# Patient Record
Sex: MALE | Race: BLACK OR AFRICAN AMERICAN | NOT HISPANIC OR LATINO | URBAN - METROPOLITAN AREA
[De-identification: names, ages, dates, MRNs, and addresses within clinical notes are randomized per-mention and may not be internally consistent; named-entity substitution may affect disease eponyms.]

---

## 2019-04-27 PROBLEM — Z00.00 ENCOUNTER FOR PREVENTIVE HEALTH EXAMINATION: Status: ACTIVE | Noted: 2019-04-27

## 2019-04-28 ENCOUNTER — OUTPATIENT (OUTPATIENT)
Dept: OUTPATIENT SERVICES | Facility: HOSPITAL | Age: 24
LOS: 1 days | End: 2019-04-28
Payer: COMMERCIAL

## 2019-04-28 ENCOUNTER — APPOINTMENT (OUTPATIENT)
Dept: CT IMAGING | Facility: IMAGING CENTER | Age: 24
End: 2019-04-28
Payer: COMMERCIAL

## 2019-04-28 DIAGNOSIS — Z00.8 ENCOUNTER FOR OTHER GENERAL EXAMINATION: ICD-10-CM

## 2019-04-28 PROCEDURE — 70450 CT HEAD/BRAIN W/O DYE: CPT

## 2019-04-28 PROCEDURE — 70450 CT HEAD/BRAIN W/O DYE: CPT | Mod: 26

## 2019-06-07 ENCOUNTER — EMERGENCY (EMERGENCY)
Facility: HOSPITAL | Age: 24
LOS: 0 days | Discharge: HOME | End: 2019-06-08
Attending: EMERGENCY MEDICINE
Payer: COMMERCIAL

## 2019-06-07 VITALS
DIASTOLIC BLOOD PRESSURE: 92 MMHG | OXYGEN SATURATION: 97 % | WEIGHT: 270.07 LBS | HEART RATE: 80 BPM | HEIGHT: 68 IN | SYSTOLIC BLOOD PRESSURE: 155 MMHG | RESPIRATION RATE: 19 BRPM | TEMPERATURE: 98 F

## 2019-06-07 DIAGNOSIS — J45.901 UNSPECIFIED ASTHMA WITH (ACUTE) EXACERBATION: ICD-10-CM

## 2019-06-07 PROCEDURE — 99285 EMERGENCY DEPT VISIT HI MDM: CPT | Mod: 25

## 2019-06-07 NOTE — ED ADULT TRIAGE NOTE - CHIEF COMPLAINT QUOTE
hx of asthma reports exacerbation took Albuterol an hour ago. additionally has persistent cough, and state I have felling of someone sitting on my chest

## 2019-06-08 LAB
ALBUMIN SERPL ELPH-MCNC: 3.9 G/DL — SIGNIFICANT CHANGE UP (ref 3.3–5)
ALP SERPL-CCNC: 56 U/L — SIGNIFICANT CHANGE UP (ref 40–120)
ALT FLD-CCNC: 34 U/L — SIGNIFICANT CHANGE UP (ref 12–78)
ANION GAP SERPL CALC-SCNC: 8 MMOL/L — SIGNIFICANT CHANGE UP (ref 5–17)
APTT BLD: 31.4 SEC — SIGNIFICANT CHANGE UP (ref 27.5–36.3)
AST SERPL-CCNC: 17 U/L — SIGNIFICANT CHANGE UP (ref 15–37)
BASE EXCESS BLDA CALC-SCNC: 0.9 MMOL/L — SIGNIFICANT CHANGE UP (ref -2–2)
BASOPHILS # BLD AUTO: 0.03 K/UL — SIGNIFICANT CHANGE UP (ref 0–0.2)
BASOPHILS NFR BLD AUTO: 0.3 % — SIGNIFICANT CHANGE UP (ref 0–2)
BILIRUB SERPL-MCNC: 0.3 MG/DL — SIGNIFICANT CHANGE UP (ref 0.2–1.2)
BLOOD GAS COMMENTS: SIGNIFICANT CHANGE UP
BLOOD GAS COMMENTS: SIGNIFICANT CHANGE UP
BLOOD GAS SOURCE: SIGNIFICANT CHANGE UP
BUN SERPL-MCNC: 17 MG/DL — SIGNIFICANT CHANGE UP (ref 7–23)
CALCIUM SERPL-MCNC: 8.3 MG/DL — LOW (ref 8.5–10.1)
CHLORIDE SERPL-SCNC: 109 MMOL/L — HIGH (ref 96–108)
CO2 SERPL-SCNC: 26 MMOL/L — SIGNIFICANT CHANGE UP (ref 22–31)
CREAT SERPL-MCNC: 1.18 MG/DL — SIGNIFICANT CHANGE UP (ref 0.5–1.3)
EOSINOPHIL # BLD AUTO: 0 K/UL — SIGNIFICANT CHANGE UP (ref 0–0.5)
EOSINOPHIL NFR BLD AUTO: 0 % — SIGNIFICANT CHANGE UP (ref 0–6)
GLUCOSE SERPL-MCNC: 89 MG/DL — SIGNIFICANT CHANGE UP (ref 70–99)
HCO3 BLDA-SCNC: 25 MMOL/L — SIGNIFICANT CHANGE UP (ref 21–29)
HCT VFR BLD CALC: 46.8 % — SIGNIFICANT CHANGE UP (ref 39–50)
HGB BLD-MCNC: 15.1 G/DL — SIGNIFICANT CHANGE UP (ref 13–17)
HOROWITZ INDEX BLDA+IHG-RTO: 21 — SIGNIFICANT CHANGE UP
IMM GRANULOCYTES NFR BLD AUTO: 0.3 % — SIGNIFICANT CHANGE UP (ref 0–1.5)
INR BLD: 1.1 RATIO — SIGNIFICANT CHANGE UP (ref 0.88–1.16)
LYMPHOCYTES # BLD AUTO: 1.51 K/UL — SIGNIFICANT CHANGE UP (ref 1–3.3)
LYMPHOCYTES # BLD AUTO: 13.6 % — SIGNIFICANT CHANGE UP (ref 13–44)
MAGNESIUM SERPL-MCNC: 2.2 MG/DL — SIGNIFICANT CHANGE UP (ref 1.6–2.6)
MCHC RBC-ENTMCNC: 27.9 PG — SIGNIFICANT CHANGE UP (ref 27–34)
MCHC RBC-ENTMCNC: 32.3 GM/DL — SIGNIFICANT CHANGE UP (ref 32–36)
MCV RBC AUTO: 86.3 FL — SIGNIFICANT CHANGE UP (ref 80–100)
MONOCYTES # BLD AUTO: 1.25 K/UL — HIGH (ref 0–0.9)
MONOCYTES NFR BLD AUTO: 11.2 % — SIGNIFICANT CHANGE UP (ref 2–14)
NEUTROPHILS # BLD AUTO: 8.32 K/UL — HIGH (ref 1.8–7.4)
NEUTROPHILS NFR BLD AUTO: 74.6 % — SIGNIFICANT CHANGE UP (ref 43–77)
NRBC # BLD: 0 /100 WBCS — SIGNIFICANT CHANGE UP (ref 0–0)
NT-PROBNP SERPL-SCNC: 7 PG/ML — SIGNIFICANT CHANGE UP (ref 0–125)
PCO2 BLDA: 41 MMHG — SIGNIFICANT CHANGE UP (ref 32–46)
PH BLD: 7.4 — SIGNIFICANT CHANGE UP (ref 7.35–7.45)
PLATELET # BLD AUTO: 245 K/UL — SIGNIFICANT CHANGE UP (ref 150–400)
PO2 BLDA: 84 MMHG — SIGNIFICANT CHANGE UP (ref 74–108)
POTASSIUM SERPL-MCNC: 4 MMOL/L — SIGNIFICANT CHANGE UP (ref 3.5–5.3)
POTASSIUM SERPL-SCNC: 4 MMOL/L — SIGNIFICANT CHANGE UP (ref 3.5–5.3)
PROT SERPL-MCNC: 7.2 GM/DL — SIGNIFICANT CHANGE UP (ref 6–8.3)
PROTHROM AB SERPL-ACNC: 12.3 SEC — SIGNIFICANT CHANGE UP (ref 10–12.9)
RBC # BLD: 5.42 M/UL — SIGNIFICANT CHANGE UP (ref 4.2–5.8)
RBC # FLD: 13.6 % — SIGNIFICANT CHANGE UP (ref 10.3–14.5)
SAO2 % BLDA: 96 % — SIGNIFICANT CHANGE UP (ref 92–96)
SODIUM SERPL-SCNC: 143 MMOL/L — SIGNIFICANT CHANGE UP (ref 135–145)
TROPONIN I SERPL-MCNC: <.015 NG/ML — SIGNIFICANT CHANGE UP (ref 0.01–0.04)
WBC # BLD: 11.14 K/UL — HIGH (ref 3.8–10.5)
WBC # FLD AUTO: 11.14 K/UL — HIGH (ref 3.8–10.5)

## 2019-06-08 PROCEDURE — 71275 CT ANGIOGRAPHY CHEST: CPT | Mod: 26

## 2019-06-08 PROCEDURE — 93010 ELECTROCARDIOGRAM REPORT: CPT

## 2019-06-08 RX ORDER — ALBUTEROL 90 UG/1
2.5 AEROSOL, METERED ORAL ONCE
Refills: 0 | Status: COMPLETED | OUTPATIENT
Start: 2019-06-08 | End: 2019-06-08

## 2019-06-08 RX ORDER — SODIUM CHLORIDE 9 MG/ML
1000 INJECTION INTRAMUSCULAR; INTRAVENOUS; SUBCUTANEOUS ONCE
Refills: 0 | Status: COMPLETED | OUTPATIENT
Start: 2019-06-08 | End: 2019-06-08

## 2019-06-08 RX ORDER — IPRATROPIUM/ALBUTEROL SULFATE 18-103MCG
3 AEROSOL WITH ADAPTER (GRAM) INHALATION ONCE
Refills: 0 | Status: COMPLETED | OUTPATIENT
Start: 2019-06-08 | End: 2019-06-08

## 2019-06-08 RX ORDER — MAGNESIUM SULFATE 500 MG/ML
2 VIAL (ML) INJECTION ONCE
Refills: 0 | Status: COMPLETED | OUTPATIENT
Start: 2019-06-08 | End: 2019-06-08

## 2019-06-08 RX ADMIN — ALBUTEROL 2.5 MILLIGRAM(S): 90 AEROSOL, METERED ORAL at 01:27

## 2019-06-08 RX ADMIN — Medication 50 GRAM(S): at 00:58

## 2019-06-08 RX ADMIN — SODIUM CHLORIDE 1000 MILLILITER(S): 9 INJECTION INTRAMUSCULAR; INTRAVENOUS; SUBCUTANEOUS at 01:09

## 2019-06-08 RX ADMIN — Medication 3 MILLILITER(S): at 01:05

## 2019-06-08 NOTE — ED PROVIDER NOTE - CLINICAL SUMMARY MEDICAL DECISION MAKING FREE TEXT BOX
PAtient with increasing shortness of breath for the last few weeks.  VSS.  Lab values reviewed, there are no values which require acute intervention.  CTA negative for PE, has some suggestion of pulmonary hypertension.  Nebs with some improvement, patient has no wheezing, ABG normal, and he is ambulating.  OFfered admission, but patient feels that he can follow up with o/p pulmonology and cardiology, d/w parent and patient that he should be seen by cards.  Discussed results and outcome of today's visit with the patient.  Patient advised to please follow up with another healthcare provider within the next 24 hours and return to the Emergency Department for worsening symptoms or any other concerns.  Patient advised that their doctor may call  to follow up on the specific results of the tests performed today in the emergency department.   Patient appears well on discharge.

## 2019-06-08 NOTE — ED ADULT NURSE NOTE - OBJECTIVE STATEMENT
asthma exacerbation, speaking in clear full sentences. no grunting or use of accessory muscles noted

## 2019-06-08 NOTE — ED PROVIDER NOTE - OBJECTIVE STATEMENT
Pertinent PMH/PSH/FHx/SHx and Review of Systems contained within:  Patient presents to the ED for shortness of breath.  Patient present with mother, has been having increased shortness of breath and difficulty controlling his asthma for the last few weeks.  Says that he gets very temporary relief and feels short of breath again.  Mother who is NP says that she does not always hear wheezing.  Denies fever or productive cough.  Says that he often has increased asthma attacks at this time of the year. Seen by PMD who wanted to switch him from Symbicort to Advair.  No chest pain or leg swelling.  No prior history of intubation.      Relevant PMHx/SHx/SOCHx/FAMH:  Asthma, seasonal allergies, developmental delay  Patient denies EtOH/tobacco/illicit substance use.    ROS: No fever/chills, No headache/photophobia/eye pain/changes in vision, No ear pain/sore throat/dysphagia, No chest pain/palpitations, no cough/stridor, No abdominal pain, No N/V/D/melena, no dysuria/frequency/discharge, No neck/back pain, no rash, no changes in neurological status/function.

## 2019-06-10 ENCOUNTER — APPOINTMENT (OUTPATIENT)
Dept: CARDIOLOGY | Facility: CLINIC | Age: 24
End: 2019-06-10
Payer: COMMERCIAL

## 2019-06-10 VITALS
OXYGEN SATURATION: 98 % | DIASTOLIC BLOOD PRESSURE: 70 MMHG | SYSTOLIC BLOOD PRESSURE: 120 MMHG | HEIGHT: 68 IN | HEART RATE: 88 BPM | BODY MASS INDEX: 40.92 KG/M2 | WEIGHT: 270 LBS

## 2019-06-10 DIAGNOSIS — R06.00 DYSPNEA, UNSPECIFIED: ICD-10-CM

## 2019-06-10 DIAGNOSIS — R07.89 OTHER CHEST PAIN: ICD-10-CM

## 2019-06-10 PROCEDURE — 93000 ELECTROCARDIOGRAM COMPLETE: CPT

## 2019-06-10 PROCEDURE — 99204 OFFICE O/P NEW MOD 45 MIN: CPT

## 2019-06-10 PROCEDURE — 93306 TTE W/DOPPLER COMPLETE: CPT

## 2019-06-11 ENCOUNTER — LABORATORY RESULT (OUTPATIENT)
Age: 24
End: 2019-06-11

## 2019-06-11 ENCOUNTER — APPOINTMENT (OUTPATIENT)
Dept: PULMONOLOGY | Facility: CLINIC | Age: 24
End: 2019-06-11
Payer: COMMERCIAL

## 2019-06-11 VITALS
HEART RATE: 97 BPM | OXYGEN SATURATION: 69 % | TEMPERATURE: 97.2 F | SYSTOLIC BLOOD PRESSURE: 141 MMHG | WEIGHT: 270 LBS | RESPIRATION RATE: 18 BRPM | BODY MASS INDEX: 40.92 KG/M2 | HEIGHT: 68 IN | DIASTOLIC BLOOD PRESSURE: 89 MMHG

## 2019-06-11 DIAGNOSIS — Z86.59 PERSONAL HISTORY OF OTHER MENTAL AND BEHAVIORAL DISORDERS: ICD-10-CM

## 2019-06-11 DIAGNOSIS — Z87.898 PERSONAL HISTORY OF OTHER SPECIFIED CONDITIONS: ICD-10-CM

## 2019-06-11 LAB
BASOPHILS # BLD AUTO: 0.02 K/UL
BASOPHILS NFR BLD AUTO: 0.2 %
EOSINOPHIL # BLD AUTO: 0.04 K/UL
EOSINOPHIL NFR BLD AUTO: 0.4 %
HCT VFR BLD CALC: 48.8 %
HGB BLD-MCNC: 15.4 G/DL
IMM GRANULOCYTES NFR BLD AUTO: 0.5 %
LYMPHOCYTES # BLD AUTO: 1.6 K/UL
LYMPHOCYTES NFR BLD AUTO: 16.3 %
MAN DIFF?: NORMAL
MCHC RBC-ENTMCNC: 27.4 PG
MCHC RBC-ENTMCNC: 31.6 GM/DL
MCV RBC AUTO: 86.8 FL
MONOCYTES # BLD AUTO: 0.88 K/UL
MONOCYTES NFR BLD AUTO: 9 %
NEUTROPHILS # BLD AUTO: 7.24 K/UL
NEUTROPHILS NFR BLD AUTO: 73.6 %
PLATELET # BLD AUTO: 244 K/UL
RBC # BLD: 5.62 M/UL
RBC # FLD: 13.9 %
WBC # FLD AUTO: 9.83 K/UL

## 2019-06-11 PROCEDURE — 99204 OFFICE O/P NEW MOD 45 MIN: CPT

## 2019-06-11 NOTE — HISTORY OF PRESENT ILLNESS
[FreeTextEntry1] : Patient with lifelong hx of asthma, recently has noticed an increase in symptoms. First started on symbicort, then PRednisone. Finally went to ER in [facility], CT negative for PE, but showed enlarged PA and heart. Saw cardiologist yesterday and given clean bill of health. Patient now still notes chest tightness and discomfort, as well as shortness of breath. Used nebulizer three times yesterday. Also used symbicort twice yesterday. Patient has hx of allergic rhinitis, seasonal, no pets at home.

## 2019-06-11 NOTE — HISTORY OF PRESENT ILLNESS
[FreeTextEntry1] : Patient with lifelong hx of asthma, recently has noticed an increase in symptoms. First started on symbicort, then PRednisone. Finally went to ER in [facility], CT negative for PE, but showed enlarged PA and heart. Saw cardiologist yesterday and given clean bill of health. Patient now still notes chest tightness and discomfort, as well as shortness of breath. Used nebulizer three times yesterday. Also used symbicort twice yesterday. Patient has hx of allergic rhinitis, seasonal, no pets at home.

## 2019-06-11 NOTE — PHYSICAL EXAM
[General Appearance - Well Developed] : well developed [Well Groomed] : well groomed [Normal Appearance] : normal appearance [General Appearance - Well Nourished] : well nourished [General Appearance - In No Acute Distress] : no acute distress [No Deformities] : no deformities [Normal Conjunctiva] : the conjunctiva exhibited no abnormalities [Eyelids - No Xanthelasma] : the eyelids demonstrated no xanthelasmas [Normal Oropharynx] : normal oropharynx [Neck Appearance] : the appearance of the neck was normal [Neck Cervical Mass (___cm)] : no neck mass was observed [Jugular Venous Distention Increased] : there was no jugular-venous distention [Thyroid Diffuse Enlargement] : the thyroid was not enlarged [Thyroid Nodule] : there were no palpable thyroid nodules [Heart Rate And Rhythm] : heart rate and rhythm were normal [Murmurs] : no murmurs present [Heart Sounds] : normal S1 and S2 [Respiration, Rhythm And Depth] : normal respiratory rhythm and effort [Exaggerated Use Of Accessory Muscles For Inspiration] : no accessory muscle use [Auscultation Breath Sounds / Voice Sounds] : lungs were clear to auscultation bilaterally [Abdomen Soft] : soft [Abdomen Tenderness] : non-tender [Abdomen Mass (___ Cm)] : no abdominal mass palpated [Gait - Sufficient For Exercise Testing] : the gait was sufficient for exercise testing [Abnormal Walk] : normal gait [Nail Clubbing] : no clubbing of the fingernails [Cyanosis, Localized] : no localized cyanosis [] : no ischemic changes [Petechial Hemorrhages (___cm)] : no petechial hemorrhages

## 2019-06-11 NOTE — PHYSICAL EXAM
[General Appearance - Well Developed] : well developed [Well Groomed] : well groomed [Normal Appearance] : normal appearance [General Appearance - Well Nourished] : well nourished [General Appearance - In No Acute Distress] : no acute distress [No Deformities] : no deformities [Normal Conjunctiva] : the conjunctiva exhibited no abnormalities [Normal Oropharynx] : normal oropharynx [Eyelids - No Xanthelasma] : the eyelids demonstrated no xanthelasmas [Neck Appearance] : the appearance of the neck was normal [Neck Cervical Mass (___cm)] : no neck mass was observed [Jugular Venous Distention Increased] : there was no jugular-venous distention [Thyroid Diffuse Enlargement] : the thyroid was not enlarged [Thyroid Nodule] : there were no palpable thyroid nodules [Heart Rate And Rhythm] : heart rate and rhythm were normal [Murmurs] : no murmurs present [Heart Sounds] : normal S1 and S2 [Respiration, Rhythm And Depth] : normal respiratory rhythm and effort [Abdomen Soft] : soft [Auscultation Breath Sounds / Voice Sounds] : lungs were clear to auscultation bilaterally [Exaggerated Use Of Accessory Muscles For Inspiration] : no accessory muscle use [Abdomen Tenderness] : non-tender [Abdomen Mass (___ Cm)] : no abdominal mass palpated [Abnormal Walk] : normal gait [Gait - Sufficient For Exercise Testing] : the gait was sufficient for exercise testing [Nail Clubbing] : no clubbing of the fingernails [Cyanosis, Localized] : no localized cyanosis [Petechial Hemorrhages (___cm)] : no petechial hemorrhages [] : no ischemic changes

## 2019-06-11 NOTE — REVIEW OF SYSTEMS
[Nasal Congestion] : nasal congestion [Postnasal Drip] : postnasal drip [Hay Fever] : hay fever [As Noted in HPI] : as noted in HPI [Nasal Discharge] : nasal discharge [Anxiety] : anxiety [Negative] : Dermatologic

## 2019-06-11 NOTE — REVIEW OF SYSTEMS
[Nasal Congestion] : nasal congestion [Postnasal Drip] : postnasal drip [As Noted in HPI] : as noted in HPI [Hay Fever] : hay fever [Nasal Discharge] : nasal discharge [Anxiety] : anxiety [Negative] : Neurologic

## 2019-06-11 NOTE — ASSESSMENT
[FreeTextEntry1] : 1. Asthma: Poorly controlled, recent exacerbation. Will change from symbicort to breo for ease of use. Continue flonase for allergic symtpoms. Will check bloodwork, Pfts and return in 3weeks.

## 2019-06-14 LAB
A ALTERNATA IGE QN: 6.62 KUA/L
A FUMIGATUS IGE QN: 4.17 KUA/L
C ALBICANS IGE QN: 5.8 KUA/L
C HERBARUM IGE QN: 2.75 KUA/L
CAT DANDER IGE QN: 1.24 KUA/L
COMMON RAGWEED IGE QN: 1.89 KUA/L
D FARINAE IGE QN: 3.06 KUA/L
D PTERONYSS IGE QN: 2.36 KUA/L
DEPRECATED A ALTERNATA IGE RAST QL: 3
DEPRECATED A FUMIGATUS IGE RAST QL: 3
DEPRECATED C ALBICANS IGE RAST QL: 3
DEPRECATED C HERBARUM IGE RAST QL: 2
DEPRECATED CAT DANDER IGE RAST QL: 2
DEPRECATED COMMON RAGWEED IGE RAST QL: 2
DEPRECATED D FARINAE IGE RAST QL: 2
DEPRECATED D PTERONYSS IGE RAST QL: 2
DEPRECATED DOG DANDER IGE RAST QL: 1
DEPRECATED M RACEMOSUS IGE RAST QL: 2
DEPRECATED ROACH IGE RAST QL: 0
DEPRECATED TIMOTHY IGE RAST QL: 3
DEPRECATED WHITE OAK IGE RAST QL: 4
DOG DANDER IGE QN: 0.39 KUA/L
M RACEMOSUS IGE QN: 0.82 KUA/L
ROACH IGE QN: <0.1 KUA/L
TIMOTHY IGE QN: 14.7 KUA/L
TOTAL IGE SMQN RAST: 299 KU/L
WHITE OAK IGE QN: 26.3 KUA/L

## 2019-06-17 ENCOUNTER — APPOINTMENT (OUTPATIENT)
Dept: CARDIOLOGY | Facility: CLINIC | Age: 24
End: 2019-06-17

## 2019-06-21 ENCOUNTER — APPOINTMENT (OUTPATIENT)
Dept: PULMONOLOGY | Facility: CLINIC | Age: 24
End: 2019-06-21
Payer: COMMERCIAL

## 2019-06-21 VITALS
RESPIRATION RATE: 16 BRPM | HEART RATE: 72 BPM | TEMPERATURE: 97.2 F | SYSTOLIC BLOOD PRESSURE: 125 MMHG | BODY MASS INDEX: 40.77 KG/M2 | DIASTOLIC BLOOD PRESSURE: 80 MMHG | HEIGHT: 68 IN | WEIGHT: 269 LBS

## 2019-06-21 PROCEDURE — ZZZZZ: CPT

## 2019-06-21 PROCEDURE — 94729 DIFFUSING CAPACITY: CPT

## 2019-06-21 PROCEDURE — 94726 PLETHYSMOGRAPHY LUNG VOLUMES: CPT

## 2019-06-21 PROCEDURE — 99214 OFFICE O/P EST MOD 30 MIN: CPT | Mod: 25

## 2019-06-21 PROCEDURE — 94060 EVALUATION OF WHEEZING: CPT

## 2019-06-21 NOTE — REVIEW OF SYSTEMS
[Postnasal Drip] : postnasal drip [As Noted in HPI] : as noted in HPI [Nasal Congestion] : nasal congestion [Nasal Discharge] : nasal discharge [Hay Fever] : hay fever [Anxiety] : anxiety [Negative] : Neurologic

## 2019-06-21 NOTE — ASSESSMENT
[FreeTextEntry1] : 1. Asthma: Patient improved on Breo. Advised to continue Breo 100mcg and return to office in 6 weeks for f/u. Advised to call office if rescue inhaler use increases or symptoms worsen. Discussed allergy profile as he is allergic to a number of allergens and provided a copy of results.

## 2019-06-21 NOTE — HISTORY OF PRESENT ILLNESS
[FreeTextEntry1] : Patient doing better since switching from Symbicort to Breo 100mcg. Reports that his breathing feels much better. Still using his rescue inhaler 1-2 times daily, but improved from last visit. He states that he has been compliant with Breo daily.\par \par PFT today shows mild obstruction with a positive bronchodilator response.

## 2019-06-21 NOTE — PHYSICAL EXAM
[General Appearance - Well Developed] : well developed [Well Groomed] : well groomed [General Appearance - Well Nourished] : well nourished [Normal Appearance] : normal appearance [No Deformities] : no deformities [Eyelids - No Xanthelasma] : the eyelids demonstrated no xanthelasmas [General Appearance - In No Acute Distress] : no acute distress [Normal Conjunctiva] : the conjunctiva exhibited no abnormalities [Neck Appearance] : the appearance of the neck was normal [Normal Oropharynx] : normal oropharynx [Neck Cervical Mass (___cm)] : no neck mass was observed [Jugular Venous Distention Increased] : there was no jugular-venous distention [Thyroid Diffuse Enlargement] : the thyroid was not enlarged [Thyroid Nodule] : there were no palpable thyroid nodules [Heart Rate And Rhythm] : heart rate and rhythm were normal [Heart Sounds] : normal S1 and S2 [Murmurs] : no murmurs present [Exaggerated Use Of Accessory Muscles For Inspiration] : no accessory muscle use [Respiration, Rhythm And Depth] : normal respiratory rhythm and effort [Abdomen Tenderness] : non-tender [Abdomen Soft] : soft [Auscultation Breath Sounds / Voice Sounds] : lungs were clear to auscultation bilaterally [Abdomen Mass (___ Cm)] : no abdominal mass palpated [Abnormal Walk] : normal gait [Nail Clubbing] : no clubbing of the fingernails [Gait - Sufficient For Exercise Testing] : the gait was sufficient for exercise testing [] : no ischemic changes [Petechial Hemorrhages (___cm)] : no petechial hemorrhages [Cyanosis, Localized] : no localized cyanosis

## 2019-08-02 ENCOUNTER — APPOINTMENT (OUTPATIENT)
Dept: PULMONOLOGY | Facility: CLINIC | Age: 24
End: 2019-08-02

## 2019-08-15 ENCOUNTER — APPOINTMENT (OUTPATIENT)
Dept: PULMONOLOGY | Facility: CLINIC | Age: 24
End: 2019-08-15
Payer: COMMERCIAL

## 2019-08-15 VITALS
SYSTOLIC BLOOD PRESSURE: 157 MMHG | OXYGEN SATURATION: 97 % | WEIGHT: 210 LBS | RESPIRATION RATE: 17 BRPM | HEART RATE: 94 BPM | DIASTOLIC BLOOD PRESSURE: 94 MMHG | TEMPERATURE: 98.3 F

## 2019-08-15 PROCEDURE — 99214 OFFICE O/P EST MOD 30 MIN: CPT

## 2019-08-15 NOTE — HISTORY OF PRESENT ILLNESS
[FreeTextEntry1] : Doing well. ALmost no use of rescue inhaler.  Allergies under good control as well. NEeds refills.

## 2019-08-15 NOTE — PHYSICAL EXAM
[Normal Appearance] : normal appearance [General Appearance - Well Developed] : well developed [Well Groomed] : well groomed [General Appearance - Well Nourished] : well nourished [No Deformities] : no deformities [General Appearance - In No Acute Distress] : no acute distress [Normal Conjunctiva] : the conjunctiva exhibited no abnormalities [Eyelids - No Xanthelasma] : the eyelids demonstrated no xanthelasmas [Normal Oropharynx] : normal oropharynx [Neck Appearance] : the appearance of the neck was normal [Jugular Venous Distention Increased] : there was no jugular-venous distention [Neck Cervical Mass (___cm)] : no neck mass was observed [Thyroid Nodule] : there were no palpable thyroid nodules [Thyroid Diffuse Enlargement] : the thyroid was not enlarged [Heart Rate And Rhythm] : heart rate and rhythm were normal [Murmurs] : no murmurs present [Heart Sounds] : normal S1 and S2 [Respiration, Rhythm And Depth] : normal respiratory rhythm and effort [Exaggerated Use Of Accessory Muscles For Inspiration] : no accessory muscle use [Auscultation Breath Sounds / Voice Sounds] : lungs were clear to auscultation bilaterally [Abdomen Soft] : soft [Abdomen Tenderness] : non-tender [Abdomen Mass (___ Cm)] : no abdominal mass palpated [Gait - Sufficient For Exercise Testing] : the gait was sufficient for exercise testing [Abnormal Walk] : normal gait [Nail Clubbing] : no clubbing of the fingernails [Cyanosis, Localized] : no localized cyanosis [Petechial Hemorrhages (___cm)] : no petechial hemorrhages [] : no ischemic changes

## 2019-08-18 PROBLEM — R07.89 CHEST DISCOMFORT: Status: ACTIVE | Noted: 2019-08-18

## 2019-08-18 PROBLEM — R06.00 DYSPNEA: Status: ACTIVE | Noted: 2019-08-18

## 2020-07-14 ENCOUNTER — RX RENEWAL (OUTPATIENT)
Age: 25
End: 2020-07-14

## 2020-09-11 ENCOUNTER — EMERGENCY (EMERGENCY)
Facility: HOSPITAL | Age: 25
LOS: 0 days | Discharge: HOME | End: 2020-09-11
Attending: EMERGENCY MEDICINE
Payer: COMMERCIAL

## 2020-09-11 VITALS
WEIGHT: 274.26 LBS | DIASTOLIC BLOOD PRESSURE: 73 MMHG | HEIGHT: 70 IN | RESPIRATION RATE: 16 BRPM | TEMPERATURE: 99 F | OXYGEN SATURATION: 97 % | SYSTOLIC BLOOD PRESSURE: 132 MMHG | HEART RATE: 78 BPM

## 2020-09-11 DIAGNOSIS — R51 HEADACHE: ICD-10-CM

## 2020-09-11 DIAGNOSIS — R53.83 OTHER FATIGUE: ICD-10-CM

## 2020-09-11 DIAGNOSIS — Z20.828 CONTACT WITH AND (SUSPECTED) EXPOSURE TO OTHER VIRAL COMMUNICABLE DISEASES: ICD-10-CM

## 2020-09-11 DIAGNOSIS — R05 COUGH: ICD-10-CM

## 2020-09-11 LAB
ALBUMIN SERPL ELPH-MCNC: 3.6 G/DL — SIGNIFICANT CHANGE UP (ref 3.3–5)
ALP SERPL-CCNC: 41 U/L — SIGNIFICANT CHANGE UP (ref 40–120)
ALT FLD-CCNC: 64 U/L — SIGNIFICANT CHANGE UP (ref 12–78)
ANION GAP SERPL CALC-SCNC: 6 MMOL/L — SIGNIFICANT CHANGE UP (ref 5–17)
AST SERPL-CCNC: 28 U/L — SIGNIFICANT CHANGE UP (ref 15–37)
BASOPHILS # BLD AUTO: 0.03 K/UL — SIGNIFICANT CHANGE UP (ref 0–0.2)
BASOPHILS NFR BLD AUTO: 0.5 % — SIGNIFICANT CHANGE UP (ref 0–2)
BILIRUB SERPL-MCNC: 0.3 MG/DL — SIGNIFICANT CHANGE UP (ref 0.2–1.2)
BUN SERPL-MCNC: 13 MG/DL — SIGNIFICANT CHANGE UP (ref 7–23)
CALCIUM SERPL-MCNC: 8.3 MG/DL — LOW (ref 8.5–10.1)
CHLORIDE SERPL-SCNC: 106 MMOL/L — SIGNIFICANT CHANGE UP (ref 96–108)
CO2 SERPL-SCNC: 27 MMOL/L — SIGNIFICANT CHANGE UP (ref 22–31)
CREAT SERPL-MCNC: 1.15 MG/DL — SIGNIFICANT CHANGE UP (ref 0.5–1.3)
EOSINOPHIL # BLD AUTO: 0.21 K/UL — SIGNIFICANT CHANGE UP (ref 0–0.5)
EOSINOPHIL NFR BLD AUTO: 3.6 % — SIGNIFICANT CHANGE UP (ref 0–6)
GLUCOSE BLDC GLUCOMTR-MCNC: 85 MG/DL — SIGNIFICANT CHANGE UP (ref 70–99)
GLUCOSE SERPL-MCNC: 83 MG/DL — SIGNIFICANT CHANGE UP (ref 70–99)
HCT VFR BLD CALC: 46.8 % — SIGNIFICANT CHANGE UP (ref 39–50)
HGB BLD-MCNC: 14.8 G/DL — SIGNIFICANT CHANGE UP (ref 13–17)
IMM GRANULOCYTES NFR BLD AUTO: 0 % — SIGNIFICANT CHANGE UP (ref 0–1.5)
LYMPHOCYTES # BLD AUTO: 1.35 K/UL — SIGNIFICANT CHANGE UP (ref 1–3.3)
LYMPHOCYTES # BLD AUTO: 23 % — SIGNIFICANT CHANGE UP (ref 13–44)
MCHC RBC-ENTMCNC: 27.3 PG — SIGNIFICANT CHANGE UP (ref 27–34)
MCHC RBC-ENTMCNC: 31.6 GM/DL — LOW (ref 32–36)
MCV RBC AUTO: 86.3 FL — SIGNIFICANT CHANGE UP (ref 80–100)
MONOCYTES # BLD AUTO: 0.63 K/UL — SIGNIFICANT CHANGE UP (ref 0–0.9)
MONOCYTES NFR BLD AUTO: 10.7 % — SIGNIFICANT CHANGE UP (ref 2–14)
NEUTROPHILS # BLD AUTO: 3.65 K/UL — SIGNIFICANT CHANGE UP (ref 1.8–7.4)
NEUTROPHILS NFR BLD AUTO: 62.2 % — SIGNIFICANT CHANGE UP (ref 43–77)
NRBC # BLD: 0 /100 WBCS — SIGNIFICANT CHANGE UP (ref 0–0)
PLATELET # BLD AUTO: 221 K/UL — SIGNIFICANT CHANGE UP (ref 150–400)
POTASSIUM SERPL-MCNC: 3.9 MMOL/L — SIGNIFICANT CHANGE UP (ref 3.5–5.3)
POTASSIUM SERPL-SCNC: 3.9 MMOL/L — SIGNIFICANT CHANGE UP (ref 3.5–5.3)
PROT SERPL-MCNC: 6.9 GM/DL — SIGNIFICANT CHANGE UP (ref 6–8.3)
RBC # BLD: 5.42 M/UL — SIGNIFICANT CHANGE UP (ref 4.2–5.8)
RBC # FLD: 13.5 % — SIGNIFICANT CHANGE UP (ref 10.3–14.5)
SODIUM SERPL-SCNC: 139 MMOL/L — SIGNIFICANT CHANGE UP (ref 135–145)
WBC # BLD: 5.87 K/UL — SIGNIFICANT CHANGE UP (ref 3.8–10.5)
WBC # FLD AUTO: 5.87 K/UL — SIGNIFICANT CHANGE UP (ref 3.8–10.5)

## 2020-09-11 PROCEDURE — 70450 CT HEAD/BRAIN W/O DYE: CPT | Mod: 26

## 2020-09-11 PROCEDURE — 71046 X-RAY EXAM CHEST 2 VIEWS: CPT | Mod: 26

## 2020-09-11 PROCEDURE — 99285 EMERGENCY DEPT VISIT HI MDM: CPT

## 2020-09-11 RX ORDER — DIPHENHYDRAMINE HCL 50 MG
25 CAPSULE ORAL ONCE
Refills: 0 | Status: COMPLETED | OUTPATIENT
Start: 2020-09-11 | End: 2020-09-11

## 2020-09-11 RX ORDER — ACETAMINOPHEN 500 MG
650 TABLET ORAL ONCE
Refills: 0 | Status: COMPLETED | OUTPATIENT
Start: 2020-09-11 | End: 2020-09-11

## 2020-09-11 RX ORDER — SODIUM CHLORIDE 9 MG/ML
1000 INJECTION INTRAMUSCULAR; INTRAVENOUS; SUBCUTANEOUS ONCE
Refills: 0 | Status: COMPLETED | OUTPATIENT
Start: 2020-09-11 | End: 2020-09-11

## 2020-09-11 RX ORDER — METOCLOPRAMIDE HCL 10 MG
10 TABLET ORAL ONCE
Refills: 0 | Status: COMPLETED | OUTPATIENT
Start: 2020-09-11 | End: 2020-09-11

## 2020-09-11 RX ADMIN — Medication 650 MILLIGRAM(S): at 16:27

## 2020-09-11 RX ADMIN — Medication 25 MILLIGRAM(S): at 16:56

## 2020-09-11 RX ADMIN — SODIUM CHLORIDE 2000 MILLILITER(S): 9 INJECTION INTRAMUSCULAR; INTRAVENOUS; SUBCUTANEOUS at 16:23

## 2020-09-11 RX ADMIN — Medication 25 MILLIGRAM(S): at 16:23

## 2020-09-11 RX ADMIN — Medication 10 MILLIGRAM(S): at 16:23

## 2020-09-11 NOTE — ED PROVIDER NOTE - CARE PROVIDER_API CALL
Renate Botello (DO)  Neurology  [address] [place] [address]  Phone: [phone]  Fax: [phone]  Follow Up Time:

## 2020-09-11 NOTE — ED PROVIDER NOTE - OBJECTIVE STATEMENT
Pt is a 24 year old male with no significant PMH who presents to the ED today for a headache. Pt c/o generalized fatigue, non productive cough, and a headache for the last 2 days. Denies fevers, chills, vomit, SOB, CP, body aches, or abdominal pain. Patient denies EtOH/tobacco/illicit substance use.

## 2020-09-11 NOTE — ED ADULT NURSE NOTE - OBJECTIVE STATEMENT
Pt a&ox3, calm and cooperative, c/o of headache, nausea, weakness and dizziness and chills, with mild cough started yesterday. Pt family at bedside, denies chest pain, sob and vomiting, urinary/bm difficulty. Pt hx of asthma, anxiety and depression. Respirations even/unlabored, nad, 20g iv to right hand, labs drawn and sent. will continue to monitor

## 2020-09-11 NOTE — ED ADULT TRIAGE NOTE - CHIEF COMPLAINT QUOTE
23 y/o male with PMH of Anxiety/Depression. Presents to ED with C/C of non radiating frontal Headache, imbalance, fever, chills and extreme sweating that began yesterday morning. pt took Tylenol, Advil, & Allegra. pt denies any n/v/d/c, blurry vision, ear pain, or room spinning sensation.

## 2020-09-11 NOTE — ED PROVIDER NOTE - PATIENT PORTAL LINK FT
You can access the FollowMyHealth Patient Portal offered by [facility] by registering at the following website: [email or web address]. By joining [employer]’s FollowMyHealth portal, you will also be able to view your health information using other applications (apps) compatible with our system.

## 2020-09-12 LAB — SARS-COV-2 RNA SPEC QL NAA+PROBE: SIGNIFICANT CHANGE UP

## 2021-07-12 ENCOUNTER — RX RENEWAL (OUTPATIENT)
Age: 26
End: 2021-07-12

## 2021-11-10 ENCOUNTER — TRANSCRIPTION ENCOUNTER (OUTPATIENT)
Age: 26
End: 2021-11-10

## 2022-05-23 ENCOUNTER — NON-APPOINTMENT (OUTPATIENT)
Age: 27
End: 2022-05-23

## 2022-08-30 ENCOUNTER — NON-APPOINTMENT (OUTPATIENT)
Age: 27
End: 2022-08-30

## 2022-09-11 ENCOUNTER — NON-APPOINTMENT (OUTPATIENT)
Age: 27
End: 2022-09-11

## 2022-09-14 ENCOUNTER — OUTPATIENT (OUTPATIENT)
Dept: OUTPATIENT SERVICES | Facility: HOSPITAL | Age: 27
LOS: 1 days | End: 2022-09-14
Payer: COMMERCIAL

## 2022-09-14 ENCOUNTER — APPOINTMENT (OUTPATIENT)
Dept: RADIOLOGY | Facility: IMAGING CENTER | Age: 27
End: 2022-09-14

## 2022-09-14 DIAGNOSIS — Z00.8 ENCOUNTER FOR OTHER GENERAL EXAMINATION: ICD-10-CM

## 2022-09-14 PROBLEM — Z78.9 OTHER SPECIFIED HEALTH STATUS: Chronic | Status: ACTIVE | Noted: 2020-09-11

## 2022-09-14 PROCEDURE — 71046 X-RAY EXAM CHEST 2 VIEWS: CPT | Mod: 26

## 2022-09-14 PROCEDURE — 71046 X-RAY EXAM CHEST 2 VIEWS: CPT

## 2022-09-19 ENCOUNTER — APPOINTMENT (OUTPATIENT)
Dept: PULMONOLOGY | Facility: CLINIC | Age: 27
End: 2022-09-19

## 2022-09-19 PROCEDURE — 99214 OFFICE O/P EST MOD 30 MIN: CPT | Mod: 95

## 2022-09-19 NOTE — ASSESSMENT
[FreeTextEntry1] : Asthma: Con Breo daily with albuterol prn. No need to continue Singulair at this time. Con activity as tolerated. F/u in office with spirometry in 1 month\par \par Aniya SOL NP, am scribing for and in the presence of Dr. Patricio Smith, the following sections HISTORY OF PRESENT ILLNESS, PAST MEDICAL/FAMILY/SOCIAL HISTORY; REVIEW OF SYSTEMS; VITAL SIGNS; PHYSICAL EXAM; DISPOSITION.\par

## 2022-09-19 NOTE — PHYSICAL EXAM
[No Acute Distress] : no acute distress [TextBox_2] : Physical exam limited ^ telehealth encounter no acute distress, well nourished and well-appearing.

## 2022-09-19 NOTE — HISTORY OF PRESENT ILLNESS
[TextBox_4] : 26 y.o male PMH Asthma-  Tested positive on 8/31 and prescribed Paxlovid and s/p Decadron 6 mg daily x 10 days. PCP evaluated last Wed and he was still SOB with exertion. CXR was ordered - results were clear. \par On nebulizers and Breo prescribed by his mother who is a NP. He started Breo last week though still c/o chest tightness with cough\par \par Since last visit in 2019, he was off Breo without incident and only on albuterol prn with seldom use until recent Covid infection . Chest tightness has been improving since Aug 31 and he has been tapering off nebulizer down from q4h to bid now. \par

## 2022-09-19 NOTE — REASON FOR VISIT
[Home] : at home, [unfilled] , at the time of the visit. [Medical Office: ([facility])___] : at the medical office located in  [Follow-Up] : a follow-up visit

## 2023-09-27 ENCOUNTER — APPOINTMENT (OUTPATIENT)
Dept: PULMONOLOGY | Facility: CLINIC | Age: 28
End: 2023-09-27
Payer: COMMERCIAL

## 2023-09-27 VITALS
DIASTOLIC BLOOD PRESSURE: 94 MMHG | TEMPERATURE: 97.7 F | HEART RATE: 90 BPM | OXYGEN SATURATION: 97 % | BODY MASS INDEX: 37.77 KG/M2 | RESPIRATION RATE: 17 BRPM | WEIGHT: 263.8 LBS | SYSTOLIC BLOOD PRESSURE: 148 MMHG | HEIGHT: 70 IN

## 2023-09-27 PROCEDURE — 99214 OFFICE O/P EST MOD 30 MIN: CPT

## 2023-10-25 ENCOUNTER — APPOINTMENT (OUTPATIENT)
Dept: PULMONOLOGY | Facility: CLINIC | Age: 28
End: 2023-10-25

## 2023-12-08 ENCOUNTER — NON-APPOINTMENT (OUTPATIENT)
Age: 28
End: 2023-12-08

## 2023-12-08 ENCOUNTER — EMERGENCY (EMERGENCY)
Facility: HOSPITAL | Age: 28
LOS: 0 days | Discharge: HOME | End: 2023-12-08
Attending: EMERGENCY MEDICINE
Payer: COMMERCIAL

## 2023-12-08 VITALS
OXYGEN SATURATION: 99 % | TEMPERATURE: 99 F | HEART RATE: 99 BPM | WEIGHT: 259.93 LBS | HEIGHT: 71 IN | DIASTOLIC BLOOD PRESSURE: 98 MMHG | SYSTOLIC BLOOD PRESSURE: 150 MMHG | RESPIRATION RATE: 18 BRPM

## 2023-12-08 VITALS
DIASTOLIC BLOOD PRESSURE: 90 MMHG | SYSTOLIC BLOOD PRESSURE: 146 MMHG | RESPIRATION RATE: 19 BRPM | OXYGEN SATURATION: 97 % | TEMPERATURE: 99 F | HEART RATE: 103 BPM

## 2023-12-08 DIAGNOSIS — R00.0 TACHYCARDIA, UNSPECIFIED: ICD-10-CM

## 2023-12-08 DIAGNOSIS — Z91.010 ALLERGY TO PEANUTS: ICD-10-CM

## 2023-12-08 DIAGNOSIS — J45.909 UNSPECIFIED ASTHMA, UNCOMPLICATED: ICD-10-CM

## 2023-12-08 DIAGNOSIS — B97.89 OTHER VIRAL AGENTS AS THE CAUSE OF DISEASES CLASSIFIED ELSEWHERE: ICD-10-CM

## 2023-12-08 DIAGNOSIS — F41.9 ANXIETY DISORDER, UNSPECIFIED: ICD-10-CM

## 2023-12-08 DIAGNOSIS — R06.02 SHORTNESS OF BREATH: ICD-10-CM

## 2023-12-08 DIAGNOSIS — F90.9 ATTENTION-DEFICIT HYPERACTIVITY DISORDER, UNSPECIFIED TYPE: ICD-10-CM

## 2023-12-08 DIAGNOSIS — Z20.822 CONTACT WITH AND (SUSPECTED) EXPOSURE TO COVID-19: ICD-10-CM

## 2023-12-08 DIAGNOSIS — J06.9 ACUTE UPPER RESPIRATORY INFECTION, UNSPECIFIED: ICD-10-CM

## 2023-12-08 DIAGNOSIS — Z91.013 ALLERGY TO SEAFOOD: ICD-10-CM

## 2023-12-08 DIAGNOSIS — R06.2 WHEEZING: ICD-10-CM

## 2023-12-08 DIAGNOSIS — R05.3 CHRONIC COUGH: ICD-10-CM

## 2023-12-08 LAB
ALBUMIN SERPL ELPH-MCNC: 3.8 G/DL — SIGNIFICANT CHANGE UP (ref 3.3–5)
ALBUMIN SERPL ELPH-MCNC: 3.8 G/DL — SIGNIFICANT CHANGE UP (ref 3.3–5)
ALP SERPL-CCNC: 43 U/L — SIGNIFICANT CHANGE UP (ref 40–120)
ALP SERPL-CCNC: 43 U/L — SIGNIFICANT CHANGE UP (ref 40–120)
ALT FLD-CCNC: 51 U/L — SIGNIFICANT CHANGE UP (ref 12–78)
ALT FLD-CCNC: 51 U/L — SIGNIFICANT CHANGE UP (ref 12–78)
ANION GAP SERPL CALC-SCNC: 6 MMOL/L — SIGNIFICANT CHANGE UP (ref 5–17)
ANION GAP SERPL CALC-SCNC: 6 MMOL/L — SIGNIFICANT CHANGE UP (ref 5–17)
AST SERPL-CCNC: 18 U/L — SIGNIFICANT CHANGE UP (ref 15–37)
AST SERPL-CCNC: 18 U/L — SIGNIFICANT CHANGE UP (ref 15–37)
BASOPHILS # BLD AUTO: 0.02 K/UL — SIGNIFICANT CHANGE UP (ref 0–0.2)
BASOPHILS # BLD AUTO: 0.02 K/UL — SIGNIFICANT CHANGE UP (ref 0–0.2)
BASOPHILS NFR BLD AUTO: 0.2 % — SIGNIFICANT CHANGE UP (ref 0–2)
BASOPHILS NFR BLD AUTO: 0.2 % — SIGNIFICANT CHANGE UP (ref 0–2)
BILIRUB SERPL-MCNC: 0.3 MG/DL — SIGNIFICANT CHANGE UP (ref 0.2–1.2)
BILIRUB SERPL-MCNC: 0.3 MG/DL — SIGNIFICANT CHANGE UP (ref 0.2–1.2)
BUN SERPL-MCNC: 15 MG/DL — SIGNIFICANT CHANGE UP (ref 7–23)
BUN SERPL-MCNC: 15 MG/DL — SIGNIFICANT CHANGE UP (ref 7–23)
CALCIUM SERPL-MCNC: 9.1 MG/DL — SIGNIFICANT CHANGE UP (ref 8.5–10.1)
CALCIUM SERPL-MCNC: 9.1 MG/DL — SIGNIFICANT CHANGE UP (ref 8.5–10.1)
CHLORIDE SERPL-SCNC: 110 MMOL/L — HIGH (ref 96–108)
CHLORIDE SERPL-SCNC: 110 MMOL/L — HIGH (ref 96–108)
CK SERPL-CCNC: 566 U/L — HIGH (ref 26–308)
CK SERPL-CCNC: 566 U/L — HIGH (ref 26–308)
CO2 SERPL-SCNC: 25 MMOL/L — SIGNIFICANT CHANGE UP (ref 22–31)
CO2 SERPL-SCNC: 25 MMOL/L — SIGNIFICANT CHANGE UP (ref 22–31)
CREAT SERPL-MCNC: 1.2 MG/DL — SIGNIFICANT CHANGE UP (ref 0.5–1.3)
CREAT SERPL-MCNC: 1.2 MG/DL — SIGNIFICANT CHANGE UP (ref 0.5–1.3)
EGFR: 84 ML/MIN/1.73M2 — SIGNIFICANT CHANGE UP
EGFR: 84 ML/MIN/1.73M2 — SIGNIFICANT CHANGE UP
EOSINOPHIL # BLD AUTO: 0.01 K/UL — SIGNIFICANT CHANGE UP (ref 0–0.5)
EOSINOPHIL # BLD AUTO: 0.01 K/UL — SIGNIFICANT CHANGE UP (ref 0–0.5)
EOSINOPHIL NFR BLD AUTO: 0.1 % — SIGNIFICANT CHANGE UP (ref 0–6)
EOSINOPHIL NFR BLD AUTO: 0.1 % — SIGNIFICANT CHANGE UP (ref 0–6)
FLUAV AG NPH QL: SIGNIFICANT CHANGE UP
FLUAV AG NPH QL: SIGNIFICANT CHANGE UP
FLUBV AG NPH QL: SIGNIFICANT CHANGE UP
FLUBV AG NPH QL: SIGNIFICANT CHANGE UP
GLUCOSE SERPL-MCNC: 104 MG/DL — HIGH (ref 70–99)
GLUCOSE SERPL-MCNC: 104 MG/DL — HIGH (ref 70–99)
HCT VFR BLD CALC: 45.8 % — SIGNIFICANT CHANGE UP (ref 39–50)
HCT VFR BLD CALC: 45.8 % — SIGNIFICANT CHANGE UP (ref 39–50)
HGB BLD-MCNC: 15.6 G/DL — SIGNIFICANT CHANGE UP (ref 13–17)
HGB BLD-MCNC: 15.6 G/DL — SIGNIFICANT CHANGE UP (ref 13–17)
IMM GRANULOCYTES NFR BLD AUTO: 0.3 % — SIGNIFICANT CHANGE UP (ref 0–0.9)
IMM GRANULOCYTES NFR BLD AUTO: 0.3 % — SIGNIFICANT CHANGE UP (ref 0–0.9)
LYMPHOCYTES # BLD AUTO: 0.91 K/UL — LOW (ref 1–3.3)
LYMPHOCYTES # BLD AUTO: 0.91 K/UL — LOW (ref 1–3.3)
LYMPHOCYTES # BLD AUTO: 10.2 % — LOW (ref 13–44)
LYMPHOCYTES # BLD AUTO: 10.2 % — LOW (ref 13–44)
MCHC RBC-ENTMCNC: 28.4 PG — SIGNIFICANT CHANGE UP (ref 27–34)
MCHC RBC-ENTMCNC: 28.4 PG — SIGNIFICANT CHANGE UP (ref 27–34)
MCHC RBC-ENTMCNC: 34.1 G/DL — SIGNIFICANT CHANGE UP (ref 32–36)
MCHC RBC-ENTMCNC: 34.1 G/DL — SIGNIFICANT CHANGE UP (ref 32–36)
MCV RBC AUTO: 83.3 FL — SIGNIFICANT CHANGE UP (ref 80–100)
MCV RBC AUTO: 83.3 FL — SIGNIFICANT CHANGE UP (ref 80–100)
MONOCYTES # BLD AUTO: 0.41 K/UL — SIGNIFICANT CHANGE UP (ref 0–0.9)
MONOCYTES # BLD AUTO: 0.41 K/UL — SIGNIFICANT CHANGE UP (ref 0–0.9)
MONOCYTES NFR BLD AUTO: 4.6 % — SIGNIFICANT CHANGE UP (ref 2–14)
MONOCYTES NFR BLD AUTO: 4.6 % — SIGNIFICANT CHANGE UP (ref 2–14)
NEUTROPHILS # BLD AUTO: 7.55 K/UL — HIGH (ref 1.8–7.4)
NEUTROPHILS # BLD AUTO: 7.55 K/UL — HIGH (ref 1.8–7.4)
NEUTROPHILS NFR BLD AUTO: 84.6 % — HIGH (ref 43–77)
NEUTROPHILS NFR BLD AUTO: 84.6 % — HIGH (ref 43–77)
NRBC # BLD: 0 /100 WBCS — SIGNIFICANT CHANGE UP (ref 0–0)
NRBC # BLD: 0 /100 WBCS — SIGNIFICANT CHANGE UP (ref 0–0)
NT-PROBNP SERPL-SCNC: 7 PG/ML — SIGNIFICANT CHANGE UP (ref 0–125)
NT-PROBNP SERPL-SCNC: 7 PG/ML — SIGNIFICANT CHANGE UP (ref 0–125)
PLATELET # BLD AUTO: 256 K/UL — SIGNIFICANT CHANGE UP (ref 150–400)
PLATELET # BLD AUTO: 256 K/UL — SIGNIFICANT CHANGE UP (ref 150–400)
POTASSIUM SERPL-MCNC: 3.9 MMOL/L — SIGNIFICANT CHANGE UP (ref 3.5–5.3)
POTASSIUM SERPL-MCNC: 3.9 MMOL/L — SIGNIFICANT CHANGE UP (ref 3.5–5.3)
POTASSIUM SERPL-SCNC: 3.9 MMOL/L — SIGNIFICANT CHANGE UP (ref 3.5–5.3)
POTASSIUM SERPL-SCNC: 3.9 MMOL/L — SIGNIFICANT CHANGE UP (ref 3.5–5.3)
PROT SERPL-MCNC: 7.3 GM/DL — SIGNIFICANT CHANGE UP (ref 6–8.3)
PROT SERPL-MCNC: 7.3 GM/DL — SIGNIFICANT CHANGE UP (ref 6–8.3)
RBC # BLD: 5.5 M/UL — SIGNIFICANT CHANGE UP (ref 4.2–5.8)
RBC # BLD: 5.5 M/UL — SIGNIFICANT CHANGE UP (ref 4.2–5.8)
RBC # FLD: 13.8 % — SIGNIFICANT CHANGE UP (ref 10.3–14.5)
RBC # FLD: 13.8 % — SIGNIFICANT CHANGE UP (ref 10.3–14.5)
SARS-COV-2 RNA SPEC QL NAA+PROBE: SIGNIFICANT CHANGE UP
SARS-COV-2 RNA SPEC QL NAA+PROBE: SIGNIFICANT CHANGE UP
SODIUM SERPL-SCNC: 141 MMOL/L — SIGNIFICANT CHANGE UP (ref 135–145)
SODIUM SERPL-SCNC: 141 MMOL/L — SIGNIFICANT CHANGE UP (ref 135–145)
TROPONIN I, HIGH SENSITIVITY RESULT: 72.6 NG/L — SIGNIFICANT CHANGE UP
TROPONIN I, HIGH SENSITIVITY RESULT: 72.6 NG/L — SIGNIFICANT CHANGE UP
WBC # BLD: 8.93 K/UL — SIGNIFICANT CHANGE UP (ref 3.8–10.5)
WBC # BLD: 8.93 K/UL — SIGNIFICANT CHANGE UP (ref 3.8–10.5)
WBC # FLD AUTO: 8.93 K/UL — SIGNIFICANT CHANGE UP (ref 3.8–10.5)
WBC # FLD AUTO: 8.93 K/UL — SIGNIFICANT CHANGE UP (ref 3.8–10.5)

## 2023-12-08 PROCEDURE — 99285 EMERGENCY DEPT VISIT HI MDM: CPT

## 2023-12-08 PROCEDURE — 71250 CT THORAX DX C-: CPT | Mod: 26,MA

## 2023-12-08 PROCEDURE — 93010 ELECTROCARDIOGRAM REPORT: CPT

## 2023-12-08 PROCEDURE — 71045 X-RAY EXAM CHEST 1 VIEW: CPT | Mod: 26

## 2023-12-08 RX ORDER — IPRATROPIUM/ALBUTEROL SULFATE 18-103MCG
3 AEROSOL WITH ADAPTER (GRAM) INHALATION
Refills: 0 | Status: COMPLETED | OUTPATIENT
Start: 2023-12-08 | End: 2023-12-08

## 2023-12-08 RX ADMIN — Medication 3 MILLILITER(S): at 15:53

## 2023-12-08 RX ADMIN — Medication 3 MILLILITER(S): at 15:30

## 2023-12-08 RX ADMIN — Medication 3 MILLILITER(S): at 16:10

## 2023-12-08 RX ADMIN — Medication 100 MILLIGRAM(S): at 15:50

## 2023-12-08 RX ADMIN — Medication 125 MILLIGRAM(S): at 15:50

## 2023-12-08 NOTE — ED PROVIDER NOTE - CLINICAL SUMMARY MEDICAL DECISION MAKING FREE TEXT BOX
pt with CT chest noted no acute infiltrates otherwise given 2 weeks of cough  -will dc with doxy 2 week of cough persistent not improving also with occasional wheezing no fever.  Patient well appearing no acute distress, lungs clear no acute distress however persistent cough noted.  Will check CXR r/o PNA.  Treat for possible cough variant asthma with duonebs and solumedrol; Re-eval.      pt with CT chest noted no acute infiltrates otherwise given 2 weeks of cough  -will dc with doxy

## 2023-12-08 NOTE — ED PROVIDER NOTE - OBJECTIVE STATEMENT
This patient is a 28 year old man hx of Asthma, ADHD and Anxiety who presents to the ER c/o nasal congestion, sore throat, fever and chills and cough x 2 weeks.  He also has had occasional SOB.  + sick contact.  His cough has not improved despite taking Levaquin, tylenol, robitussin and steroids for the past 3 dyas. This patient is a 28 year old man hx of Asthma, ADHD and Anxiety who presents to the ER c/o nasal congestion, sore throat, fever and chills and cough x 2 weeks.  He also has had occasional SOB.  + sick contact at home.  His cough has not improved despite taking Levaquin, tylenol, robitussin and steroids for the past 3 days.

## 2023-12-08 NOTE — ED ADULT NURSE NOTE - CHIEF COMPLAINT QUOTE
pt c/o productive cough with white sputum, sob, stuffy nose, headache, sore throat x 2 weeks. hx: asthma

## 2023-12-08 NOTE — ED PROVIDER NOTE - PATIENT PORTAL LINK FT
You can access the FollowMyHealth Patient Portal offered by [facility] by registering at the following website: [email or web address]. By joining [employer]’s FollowMyHealth portal, you will also be able to view your health information using other applications (apps) compatible with our system. You can access the FollowMyHealth Patient Portal offered by [facility] by registering at the following website: [email or web address]. By joining [employer]’s FollowMyHealth portal, you will also be able to view your health information using other applications (apps) compatible with our system.

## 2023-12-08 NOTE — ED ADULT NURSE REASSESSMENT NOTE - NS ED NURSE REASSESS COMMENT FT1
Patient A&Ox4 with comfortable appearance. Denies pain or any other discomfort. Hemodynamically stable. Awaiting CT scan.

## 2023-12-08 NOTE — ED PROVIDER NOTE - NSFOLLOWUPINSTRUCTIONS_ED_ALL_ED_FT
1) Take tylenol and/or motrin for pain and/or fever  2) Take prescription medication as instructed  3) Follow-up with your pulmonologist  4) Follow up with your primary care doctor  5) Return to the ER for worsening or concerning symptoms 1) Take tylenol and/or motrin for pain and/or fever  2) Take prescription medication as instructed  3) Follow-up with your pulmonologist  4) Follow up with your primary care doctor  5) Return to the ER for worsening or concerning symptoms      Upper Respiratory Infection, Adult  An upper respiratory infection (URI) is a common viral infection of the nose, throat, and upper air passages that lead to the lungs. The most common type of URI is the common cold. URIs usually get better on their own, without medical treatment.    What are the causes?  A URI is caused by a virus. You may catch a virus by:  Breathing in droplets from an infected person's cough or sneeze.  Touching something that has been exposed to the virus (is contaminated) and then touching your mouth, nose, or eyes.  What increases the risk?  You are more likely to get a URI if:  You are very young or very old.  You have close contact with others, such as at work, school, or a health care facility.  You smoke.  You have long-term (chronic) heart or lung disease.  You have a weakened disease-fighting system (immune system).  You have nasal allergies or asthma.  You are experiencing a lot of stress.  You have poor nutrition.  What are the signs or symptoms?  A URI usually involves some of the following symptoms:  Runny or stuffy (congested) nose.  Cough.  Sneezing.  Sore throat.  Headache.  Fatigue.  Fever.  Loss of appetite.  Pain in your forehead, behind your eyes, and over your cheekbones (sinus pain).  Muscle aches.  Redness or irritation of the eyes.  Pressure in the ears or face.  How is this diagnosed?  This condition may be diagnosed based on your medical history and symptoms, and a physical exam. Your health care provider may use a swab to take a mucus sample from your nose (nasal swab). This sample can be tested to determine what virus is causing the illness.    How is this treated?  URIs usually get better on their own within 7–10 days. Medicines cannot cure URIs, but your health care provider may recommend certain medicines to help relieve symptoms, such as:  Over-the-counter cold medicines.  Cough suppressants. Coughing is a type of defense against infection that helps to clear the respiratory system, so take these medicines only as recommended by your health care provider.  Fever-reducing medicines.  Follow these instructions at home:  Activity    Rest as needed.  If you have a fever, stay home from work or school until your fever is gone or until your health care provider says your URI cannot spread to other people (is no longer contagious). Your health care provider may have you wear a face mask to prevent your infection from spreading.  Relieving symptoms    Gargle with a mixture of salt and water 3–4 times a day or as needed. To make salt water, completely dissolve ½–1 tsp (3–6 g) of salt in 1 cup (237 mL) of warm water.  Use a cool-mist humidifier to add moisture to the air. This can help you breathe more easily.  Eating and drinking    A comparison of three sample cups showing dark yellow, yellow, and pale yellow urine.  Drink enough fluid to keep your urine pale yellow.  Eat soups and other clear broths.  General instructions    A sign showing that a person should not smoke.  Take over-the-counter and prescription medicines only as told by your health care provider. These include cold medicines, fever reducers, and cough suppressants.  Do not use any products that contain nicotine or tobacco. These products include cigarettes, chewing tobacco, and vaping devices, such as e-cigarettes. If you need help quitting, ask your health care provider.  Stay away from secondhand smoke.  Stay up to date on all immunizations, including the yearly (annual) flu vaccine.  Keep all follow-up visits. This is important.  How to prevent the spread of infection to others    Washing hands with soap and water.  URIs can be contagious. To prevent the infection from spreading:  Wash your hands with soap and water for at least 20 seconds. If soap and water are not available, use hand .  Avoid touching your mouth, face, eyes, or nose.  Cough or sneeze into a tissue or your sleeve or elbow instead of into your hand or into the air.  Contact a health care provider if:  You are getting worse instead of better.  You have a fever or chills.  Your mucus is brown or red.  You have yellow or brown discharge coming from your nose.  You have pain in your face, especially when you bend forward.  You have swollen neck glands.  You have pain while swallowing.  You have white areas in the back of your throat.  Get help right away if:  You have shortness of breath that gets worse.  You have severe or persistent:  Headache.  Ear pain.  Sinus pain.  Chest pain.  You have chronic lung disease along with any of the following:  Making high-pitched whistling sounds when you breathe, most often when you breathe out (wheezing).  Prolonged cough (more than 14 days).  Coughing up blood.  A change in your usual mucus.  You have a stiff neck.  You have changes in your:  Vision.  Hearing.  Thinking.  Mood.  These symptoms may be an emergency. Get help right away. Call 911.  Do not wait to see if the symptoms will go away.  Do not drive yourself to the hospital.  Summary  An upper respiratory infection (URI) is a common infection of the nose, throat, and upper air passages that lead to the lungs.  A URI is caused by a virus.  URIs usually get better on their own within 7–10 days.  Medicines cannot cure URIs, but your health care provider may recommend certain medicines to help relieve symptoms.  This information is not intended to replace advice given to you by your health care provider. Make sure you discuss any questions you have with your health care provider.

## 2023-12-26 ENCOUNTER — APPOINTMENT (OUTPATIENT)
Dept: PULMONOLOGY | Facility: CLINIC | Age: 28
End: 2023-12-26
Payer: COMMERCIAL

## 2023-12-26 VITALS
SYSTOLIC BLOOD PRESSURE: 144 MMHG | BODY MASS INDEX: 37.51 KG/M2 | RESPIRATION RATE: 18 BRPM | DIASTOLIC BLOOD PRESSURE: 88 MMHG | TEMPERATURE: 97.2 F | OXYGEN SATURATION: 98 % | HEIGHT: 70.5 IN | HEART RATE: 85 BPM | WEIGHT: 265 LBS

## 2023-12-26 DIAGNOSIS — J45.40 MODERATE PERSISTENT ASTHMA, UNCOMPLICATED: ICD-10-CM

## 2023-12-26 PROCEDURE — 99214 OFFICE O/P EST MOD 30 MIN: CPT

## 2023-12-26 RX ORDER — FLUTICASONE FUROATE AND VILANTEROL TRIFENATATE 200; 25 UG/1; UG/1
200-25 POWDER RESPIRATORY (INHALATION)
Qty: 3 | Refills: 3 | Status: ACTIVE | COMMUNITY
Start: 2019-06-11 | End: 1900-01-01

## 2024-02-14 NOTE — HISTORY OF PRESENT ILLNESS
[Never] : never [TextBox_4] : JADIEL WARE is being seen for a follow-up visit for asthma.  History of Present Illness     27 y.o male PMH Asthma- here for f/u asthma. C/o worsening asthma/ cough and SOB over the past few weeks triggered by URI. His medication managed by his mother- he is unsure if he received prednisone. He has been adherent to Breo but needed rescue inhaler every 4 hours the past few days. He notes his symptopms are slowly improving and has only needed rescue inhaler once today  PFT 2019 FEV1/FVC 70 FEV1 79 post BD FEV1 97  TLc 98 DLCO 101 Social: Non smoker  12/26/2023: Patient was seen in ER on 12/8. Summary below: HISTORY OF PRESENT ILLNESS: International Travel: International Travel within 21 days? No.(1)  Preferred Language to Address Healthcare: - Preferred Language to Address Healthcare English  Patient Identity: - Birth Sex Male  Child Abuse Assessment (patients less than 13 yrs): Chief Complaint: cough, sob, headache.  - Chief Complaint: The patient is a 28y Male complaining of - HPI Objective Statement: This patient is a 28 year old man hx of Asthma, ADHD and Anxiety who presents to the ER c/o nasal congestion, sore throat, fever and chills and cough x 2 weeks. He also has had occasional SOB. + sick contact at home. His cough has not improved despite taking Levaquin, tylenol, robitussin and steroids for the past 3 days.  PAST MEDICAL/SURGICAL/FAMILY/SOCIAL HISTORY: Tobacco Usage: - Tobacco Usage Never smoker  ALLERGIES AND HOME MEDICATIONS: Allergies:  Allergies:  No Known Drug Allergies:  Shrimp: Food, Hives  peanuts: Food, Hives, Angioedema  Home Medications: * Patient Currently Takes Medications as of 10-Feb-2023 17:46 documented in Structured Notes -  Azithromycin 5 Day Dose Pack 250 mg oral tablet: 1 packet(s) orally once a day Z-Pack, take as directed. -  Medrol Dosepak 4 mg oral tablet: 1 blister(s) take as directed. -  amoxicillin-clavulanate 875 mg-125 mg oral tablet: 1 tab(s) orally 2 times a day  REVIEW OF SYSTEMS: Review of Systems: - CONSTITUTIONAL: - - - - Constitutional [+]: CHILLS, FEVER - ENMT: - - - - Nose [+]: NASAL CONGESTION - CARDIOVASCULAR: no chest pain and no edema. - RESPIRATORY: - - - - Respiratory [+]: COUGH, WHEEZING, SHORTNESS OF BREATH - ROS STATEMENT: all other ROS negative except as per HPI  PHYSICAL EXAM: - Physical Examination: persistent coughing at bedside with transmitted upper airway sounds - CONSTITUTIONAL: Well appearing, awake, alert, oriented to person, place, time/situation and in no apparent distress. - EYES: Clear bilaterally, pupils equal, round and reactive to light. - CARDIAC: Normal rate, regular rhythm. Heart sounds S1, S2. No murmurs, rubs or gallops. - RESPIRATORY: Breath sounds clear and equal bilaterally. - GASTROINTESTINAL: Abdomen soft, non-tender, no guarding. - NEUROLOGICAL: Alert and oriented, no focal deficits, no motor or sensory deficits. - SKIN: Skin normal color for race, warm, dry and intact. No evidence of rash.  CURRENT ORDERS/: -  albuterol/ipratropium for Nebulization, {Known as DUONEB\  3 milliLiter(s), Nebulizer, every 20 minutes, Stop After 3 Doses  Administration Instructions: Contains: 2.5 mG albuterol and 0.5 mG ipratropium, 08-Dec-2023, Active, 08-Dec-2023, Standard -  IV Insert, Time/Priority: Routine, 08-Dec-2023, Active, Standard -  Oxygen Delivery Therapy, Oxygen Method: Nasal Cannula LPM: 2   Targeted SpO2 Range (%): 88-97   Notify Provider for SpO2 BELOW: 90   O2 Titration Guideline: Device O2 Percent Range (%): 24-44%, Device O2 Flow Rate Range (LPM): 1-6LPM, O2 Titration Guideline: Increase/Decrease by 1LPM or 4%. Notify provider for any increase in oxygen therapy or inability to achieve targeted SpO2., 08-Dec-2023, Active, Standard -  Pulse Oximetry, Frequency: <Continuous>   Additional Instructions: Notify Provider if oxygen saturation is LESS THAN 92%, 08-Dec-2023, Active, Standard -  Vital Signs, Frequency: per routine, 08-Dec-2023, Active, Standard  RESULTS: General Chemistry:  08-Dec-2023 15:53, Comprehensive Metabolic Panel - Sodium 141 [135 - 145 mmol/L] - Potassium 3.9 [3.5 - 5.3 mmol/L] - Chloride Image has been removed. 110 [96 - 108 mmol/L] - Carbon Dioxide 25 [22 - 31 mmol/L] - Anion Gap 6 [5 - 17 mmol/L] - Blood Urea Nitrogen 15 [7 - 23 mg/dL] - Creatinine 1.20 [0.50 - 1.30 mg/dL] - Glucose Image has been removed. 104 [70 - 99 mg/dL] - Calcium 9.1 [8.5 - 10.1 mg/dL] - Protein Total 7.3 [6.0 - 8.3 gm/dL] - Albumin 3.8 [3.3 - 5.0 g/dL] - Bilirubin Total 0.3 [0.2 - 1.2 mg/dL] - Alkaline Phosphatase 43 [40 - 120 U/L] - Aspartate Aminotransferase (AST/SGOT) 18 [15 - 37 U/L] - Alanine Aminotransferase (ALT/SGPT) 51 [12 - 78 U/L] - eGFR 84 [>=60 mL/min/1.73m2] The estimated glomerular filtration rate (eGFR) is calculated using the  2021 CKD-EPI creatinine equation, which does not have a coefficient for  race and is validated in individuals 18 years of age and older (N Engl J  Med 2021; 385:2706-2025). Creatinine-based eGFR may be inaccurate in  various situations including but not limited to extremes of muscle mass,  altered dietary protein intake, or medications that affect renal tubular  creatinine secretion.  08-Dec-2023 15:53, Creatine Kinase, Serum - Creatine Kinase, Serum Image has been removed. 566 [26 - 308 U/L]  08-Dec-2023 15:53, Pro-Brain Natriuretic Peptide - Pro-Brain Natriuretic Peptide 7 [0 - 125 pg/mL]  08-Dec-2023 15:53, Troponin I, High Sensitivity - Troponin I, High Sensitivity Result 72.6 [ - <=78.5 ng/L] Serial measurements of high sensitivity troponin I (hs TnI) showing rapid  upward or downward changes suggest acute myocardial injury. Please note  that a sustained elevation of hsTnI can be caused by renal disease,  chronic heart failure, sepsis, pulmonary embolism and other clinical  conditions. Hematology:  08-Dec-2023 15:53, Complete Blood Count + Automated Diff - WBC Count 8.93 [3.80 - 10.50 K/uL] - RBC Count 5.50 [4.20 - 5.80 M/uL] - Hemoglobin 15.6 [13.0 - 17.0 g/dL] - Hematocrit 45.8 [39.0 - 50.0 %] - Mean Cell Volume 83.3 [80.0 - 100.0 fl] - Mean Cell Hemoglobin 28.4 [27.0 - 34.0 pg] - Mean Cell Hemoglobin Conc 34.1 [32.0 - 36.0 g/dL] - Red Cell Distrib Width 13.8 [10.3 - 14.5 %] - Platelet Count - Automated 256 [150 - 400 K/uL] - Auto Neutrophil # Image has been removed. 7.55 [1.80 - 7.40 K/uL] - Auto Lymphocyte # Image has been removed. 0.91 [1.00 - 3.30 K/uL] - Auto Monocyte # 0.41 [0.00 - 0.90 K/uL] - Auto Eosinophil # 0.01 [0.00 - 0.50 K/uL] - Auto Basophil # 0.02 [0.00 - 0.20 K/uL] - Auto Neutrophil % Image has been removed. 84.6 [43.0 - 77.0 %] Differential percentages must be correlated with absolute numbers for  clinical significance. - Auto Lymphocyte % Image has been removed. 10.2 [13.0 - 44.0 %] - Auto Monocyte % 4.6 [2.0 - 14.0 %] - Auto Eosinophil % 0.1 [0.0 - 6.0 %] - Auto Basophil % 0.2 [0.0 - 2.0 %] - Auto Immature Granulocyte % 0.3 [0.0 - 0.9 %] (Includes meta, myelo and promyelocytes). Mild elevations in immature  granulocytes may be seen with many inflammatory processes and pregnancy;  clinical correlation suggested. - Nucleated RBC 0 [0 - 0 /100 WBCs] Infectious Disease:  08-Dec-2023 15:20, Flu With COVID-19 By SHANE - SARS-CoV-2 Result NotDetec [NotDetec] EUA/IVD  This Respiratory Panel uses polymerase chain reaction (PCR) to detect for  influenza A; influenza B; and SARS-CoV-2.  This test was validated by [employer] and is in use under the FDA  Emergency Use Authorization (EUA) for clinical labs CLIA-certified to  perform high complexity testing. Test results should be correlated with  clinical presentation, patient history, and epidemiology.   X-Ray, Fluoroscopy:  08-Dec-2023 18:09, CT Chest No Cont - PACS Image: Image(s) Available  Wet Read: There are no Wet Read(s) to document.  (1) Order Name: Xray Chest 1 View- PORTABLE-Urgent Order ID: [id number] Order Date/Time: 08-Dec-2023 15:22 Order Status: Resulted.  Interpretation Date/Time: 08-Dec-2023 19:09 Interpreted By: Artemio Partida  Interpretation: CXR negative - No infiltrates, No consolidation, No atelectasis seen.  - EKG Date/Time: 08-Dec-2023 16:32 - Rate: 113 - Interpretation: abnormal - Abnormal Rhythm: sinus tachycardia - Axis: Normal - HI: 134 - QRS: 94 - QTC: 447 - ST/T Wave: No STEMI  CONSULTATIONS/SHIFT CHANGE: Shift Change: - Pending Items to be Checked and Documented: labs, CT/MRI results - Sign-Out Time: 08-Dec-2023 16:21 - Receiving Physician: Dr. Partida - Shift Change Details: F/U Labs; CT [id number]  DISPOSITION: Care Plan - Instructions: Principal Discharge DX: Viral URI with cough.  Impression: 1.  Principal Discharge Dx Viral URI with cough.  Medical Decision Making: - The following orders were submitted: Imaging Studies, Medications - Clinical Summary (MDM): Summarize the clinical encounter 2 week of cough persistent not improving also with occasional wheezing no fever. Patient well appearing no acute distress, lungs clear no acute distress however persistent cough noted. Will check CXR r/o PNA. Treat for possible cough variant asthma with duonebs and solumedrol; Re-eval.   pt with CT chest noted no acute infiltrates otherwise given 2 weeks of cough -will dc with doxy  - Follow-up Instructions (will be supplied to the patient only if discharged)  1) Take tylenol and/or motrin for pain and/or fever 2) Take prescription medication as instructed 3) Follow-up with your pulmonologist 4) Follow up with your primary care doctor 5) Return to the ER for worsening or concerning symptoms   Upper Respiratory Infection, Adult An upper respiratory infection (URI) is a common viral infection of the nose, throat, and upper air passages that lead to the lungs. The most common type of URI is the common cold. URIs usually get better on their own, without medical treatment.  What are the causes? A URI is caused by a virus. You may catch a virus by: Breathing in droplets from an infected person's cough or sneeze. Touching something that has been exposed to the virus (is contaminated) and then touching your mouth, nose, or eyes. What increases the risk? You are more likely to get a URI if: You are very young or very old. You have close contact with others, such as at work, school, or a health care facility. You smoke. You have long-term (chronic) heart or lung disease. You have a weakened disease-fighting system (immune system). You have nasal allergies or asthma. You are experiencing a lot of stress. You have poor nutrition. What are the signs or symptoms? A URI usually involves some of the following symptoms: Runny or stuffy (congested) nose. Cough. Sneezing. Sore throat. Headache. Fatigue. Fever. Loss of appetite. Pain in your forehead, behind your eyes, and over your cheekbones (sinus pain). Muscle aches. Redness or irritation of the eyes. Pressure in the ears or face. How is this diagnosed? This condition may be diagnosed based on your medical history and symptoms, and a physical exam. Your health care provider may use a swab to take a mucus sample from your nose (nasal swab). This sample can be tested to determine what virus is causing the illness.  How is this treated? URIs usually get better on their own within 7?10 days. Medicines cannot cure URIs, but your health care provider may recommend certain medicines to help relieve symptoms, such as: Over-the-counter cold medicines. Cough suppressants. Coughing is a type of defense against infection that helps to clear the respiratory system, so take these medicines only as recommended by your health care provider. Fever-reducing medicines. Follow these instructions at home: Activity  Rest as needed. If you have a fever, stay home from work or school until your fever is gone or until your health care provider says your URI cannot spread to other people (is no longer contagious). Your health care provider may have you wear a face mask to prevent your infection from spreading. Relieving symptoms  Gargle with a mixture of salt and water 3?4 times a day or as needed. To make salt water, completely dissolve ?1 tsp (3?6 g) of salt in 1 cup (237 mL) of warm water. Use a cool-mist humidifier to add moisture to the air. This can help you breathe more easily. Eating and drinking  A comparison of three sample cups showing dark yellow, yellow, and pale yellow urine. Drink enough fluid to keep your urine pale yellow. Eat soups and other clear broths. General instructions  A sign showing that a person should not smoke. Take over-the-counter and prescription medicines only as told by your health care provider. These include cold medicines, fever reducers, and cough suppressants. Do not use any products that contain nicotine or tobacco. These products include cigarettes, chewing tobacco, and vaping devices, such as e-cigarettes. If you need help quitting, ask your health care provider. Stay away from secondhand smoke. Stay up to date on all immunizations, including the yearly (annual) flu vaccine. Keep all follow-up visits. This is important. How to prevent the spread of infection to others  Washing hands with soap and water. URIs can be contagious. To prevent the infection from spreading: Wash your hands with soap and water for at least 20 seconds. If soap and water are not available, use hand . Avoid touching your mouth, face, eyes, or nose. Cough or sneeze into a tissue or your sleeve or elbow instead of into your hand or into the air. Contact a health care provider if: You are getting worse instead of better. You have a fever or chills. Your mucus is brown or red. You have yellow or brown discharge coming from your nose. You have pain in your face, especially when you bend forward. You have swollen neck glands. You have pain while swallowing. You have white areas in the back of your throat. Get help right away if: You have shortness of breath that gets worse. You have severe or persistent: Headache. Ear pain. Sinus pain. Chest pain. You have chronic lung disease along with any of the following: Making high-pitched whistling sounds when you breathe, most often when you breathe out (wheezing). Prolonged cough (more than 14 days). Coughing up blood. A change in your usual mucus. You have a stiff neck. You have changes in your: Vision. Hearing. Thinking. Mood. These symptoms may be an emergency. Get help right away. Call 911. Do not wait to see if the symptoms will go away. Do not drive yourself to the hospital. Summary An upper respiratory infection (URI) is a common infection of the nose, throat, and upper air passages that lead to the lungs. A URI is caused by a virus. URIs usually get better on their own within 7?10 days. Medicines cannot cure URIs, but your health care provider may recommend certain medicines to help relieve symptoms. This information is not intended to replace advice given to you by your health care provider. Make sure you discuss any questions you have with your health care provider.  Disposition: Disposition: DISCHARGE.  You can return to work or school on: 12-Dec-2023.  Patient requests all Lab, Cardiology, and Radiology Results on their Discharge Instructions.  Discharge Disposition: Home.  Patient ready for discharge: Patient/Caregiver provided printed discharge information.  You can access the [facility] Patient Portal offered by [employer] by registering at the following website: [email or web address] joining [employer] portal, you will also be able to view your health information using other applications (apps) compatible with our system.  Prescriptions: * Outpatient Medication Status not yet specified  ATTESTATION STATEMENT: Attestations Statements: Attending Statement: Attending Only.  PROVIDER CARE INITIATION: - Care Initiated by: Birdie Balbuena(Attending) - Provider Care Initiated at: 08-Dec-2023 13:42   Electronic Signatures: Artemio Partida) (Signed 08-Dec-2023 19:09)  Authored: RESULTS, DISPOSITION Birdie Balbuena) (Signed 15-Dec-2023 12:46)  Authored: HISTORY OF PRESENT ILLNESS, PAST MEDICAL/SURGICAL/FAMILY/SOCIAL HISTORY, ALLERGIES AND HOME MEDICATIONS, REVIEW OF SYSTEMS, PHYSICAL EXAM, CURRENT ORDERS/, RESULTS, CONSULTATIONS/SHIFT CHANGE, DISPOSITION, ATTESTATION STATEMENT, STROKE, PROVIDER CARE INITIATION   Last Updated: 15-Dec-2023 12:46 by Birdie Balbuena)  References: 1. Data Referenced From "ED ADULT Triage Note" 08-Dec-2023 12:15  12/26: Patient now feels much better. Went to gym without issues.

## 2024-02-14 NOTE — HISTORY OF PRESENT ILLNESS
[Never] : never [TextBox_4] : JADIEL WARE is being seen for a follow-up visit for asthma.  History of Present Illness     27 y.o male PMH Asthma- here for f/u asthma. C/o worsening asthma/ cough and SOB over the past few weeks triggered by URI. His medication managed by his mother- he is unsure if he received prednisone. He has been adherent to Breo but needed rescue inhaler every 4 hours the past few days. He notes his symptopms are slowly improving and has only needed rescue inhaler once today  PFT 2019 FEV1/FVC 70 FEV1 79 post BD FEV1 97  TLc 98 DLCO 101 Social: Non smoker  12/26/2023: Patient was seen in ER on 12/8. Summary below: HISTORY OF PRESENT ILLNESS: International Travel: International Travel within 21 days? No.(1)  Preferred Language to Address Healthcare: - Preferred Language to Address Healthcare English  Patient Identity: - Birth Sex Male  Child Abuse Assessment (patients less than 13 yrs): Chief Complaint: cough, sob, headache.  - Chief Complaint: The patient is a 28y Male complaining of - HPI Objective Statement: This patient is a 28 year old man hx of Asthma, ADHD and Anxiety who presents to the ER c/o nasal congestion, sore throat, fever and chills and cough x 2 weeks. He also has had occasional SOB. + sick contact at home. His cough has not improved despite taking Levaquin, tylenol, robitussin and steroids for the past 3 days.  PAST MEDICAL/SURGICAL/FAMILY/SOCIAL HISTORY: Tobacco Usage: - Tobacco Usage Never smoker  ALLERGIES AND HOME MEDICATIONS: Allergies:  Allergies:  No Known Drug Allergies:  Shrimp: Food, Hives  peanuts: Food, Hives, Angioedema  Home Medications: * Patient Currently Takes Medications as of 10-Feb-2023 17:46 documented in Structured Notes -  Azithromycin 5 Day Dose Pack 250 mg oral tablet: 1 packet(s) orally once a day Z-Pack, take as directed. -  Medrol Dosepak 4 mg oral tablet: 1 blister(s) take as directed. -  amoxicillin-clavulanate 875 mg-125 mg oral tablet: 1 tab(s) orally 2 times a day  REVIEW OF SYSTEMS: Review of Systems: - CONSTITUTIONAL: - - - - Constitutional [+]: CHILLS, FEVER - ENMT: - - - - Nose [+]: NASAL CONGESTION - CARDIOVASCULAR: no chest pain and no edema. - RESPIRATORY: - - - - Respiratory [+]: COUGH, WHEEZING, SHORTNESS OF BREATH - ROS STATEMENT: all other ROS negative except as per HPI  PHYSICAL EXAM: - Physical Examination: persistent coughing at bedside with transmitted upper airway sounds - CONSTITUTIONAL: Well appearing, awake, alert, oriented to person, place, time/situation and in no apparent distress. - EYES: Clear bilaterally, pupils equal, round and reactive to light. - CARDIAC: Normal rate, regular rhythm. Heart sounds S1, S2. No murmurs, rubs or gallops. - RESPIRATORY: Breath sounds clear and equal bilaterally. - GASTROINTESTINAL: Abdomen soft, non-tender, no guarding. - NEUROLOGICAL: Alert and oriented, no focal deficits, no motor or sensory deficits. - SKIN: Skin normal color for race, warm, dry and intact. No evidence of rash.  CURRENT ORDERS/: -  albuterol/ipratropium for Nebulization, {Known as DUONEB\  3 milliLiter(s), Nebulizer, every 20 minutes, Stop After 3 Doses  Administration Instructions: Contains: 2.5 mG albuterol and 0.5 mG ipratropium, 08-Dec-2023, Active, 08-Dec-2023, Standard -  IV Insert, Time/Priority: Routine, 08-Dec-2023, Active, Standard -  Oxygen Delivery Therapy, Oxygen Method: Nasal Cannula LPM: 2   Targeted SpO2 Range (%): 88-97   Notify Provider for SpO2 BELOW: 90   O2 Titration Guideline: Device O2 Percent Range (%): 24-44%, Device O2 Flow Rate Range (LPM): 1-6LPM, O2 Titration Guideline: Increase/Decrease by 1LPM or 4%. Notify provider for any increase in oxygen therapy or inability to achieve targeted SpO2., 08-Dec-2023, Active, Standard -  Pulse Oximetry, Frequency: <Continuous>   Additional Instructions: Notify Provider if oxygen saturation is LESS THAN 92%, 08-Dec-2023, Active, Standard -  Vital Signs, Frequency: per routine, 08-Dec-2023, Active, Standard  RESULTS: General Chemistry:  08-Dec-2023 15:53, Comprehensive Metabolic Panel - Sodium 141 [135 - 145 mmol/L] - Potassium 3.9 [3.5 - 5.3 mmol/L] - Chloride Image has been removed. 110 [96 - 108 mmol/L] - Carbon Dioxide 25 [22 - 31 mmol/L] - Anion Gap 6 [5 - 17 mmol/L] - Blood Urea Nitrogen 15 [7 - 23 mg/dL] - Creatinine 1.20 [0.50 - 1.30 mg/dL] - Glucose Image has been removed. 104 [70 - 99 mg/dL] - Calcium 9.1 [8.5 - 10.1 mg/dL] - Protein Total 7.3 [6.0 - 8.3 gm/dL] - Albumin 3.8 [3.3 - 5.0 g/dL] - Bilirubin Total 0.3 [0.2 - 1.2 mg/dL] - Alkaline Phosphatase 43 [40 - 120 U/L] - Aspartate Aminotransferase (AST/SGOT) 18 [15 - 37 U/L] - Alanine Aminotransferase (ALT/SGPT) 51 [12 - 78 U/L] - eGFR 84 [>=60 mL/min/1.73m2] The estimated glomerular filtration rate (eGFR) is calculated using the  2021 CKD-EPI creatinine equation, which does not have a coefficient for  race and is validated in individuals 18 years of age and older (N Engl J  Med 2021; 385:1198-5471). Creatinine-based eGFR may be inaccurate in  various situations including but not limited to extremes of muscle mass,  altered dietary protein intake, or medications that affect renal tubular  creatinine secretion.  08-Dec-2023 15:53, Creatine Kinase, Serum - Creatine Kinase, Serum Image has been removed. 566 [26 - 308 U/L]  08-Dec-2023 15:53, Pro-Brain Natriuretic Peptide - Pro-Brain Natriuretic Peptide 7 [0 - 125 pg/mL]  08-Dec-2023 15:53, Troponin I, High Sensitivity - Troponin I, High Sensitivity Result 72.6 [ - <=78.5 ng/L] Serial measurements of high sensitivity troponin I (hs TnI) showing rapid  upward or downward changes suggest acute myocardial injury. Please note  that a sustained elevation of hsTnI can be caused by renal disease,  chronic heart failure, sepsis, pulmonary embolism and other clinical  conditions. Hematology:  08-Dec-2023 15:53, Complete Blood Count + Automated Diff - WBC Count 8.93 [3.80 - 10.50 K/uL] - RBC Count 5.50 [4.20 - 5.80 M/uL] - Hemoglobin 15.6 [13.0 - 17.0 g/dL] - Hematocrit 45.8 [39.0 - 50.0 %] - Mean Cell Volume 83.3 [80.0 - 100.0 fl] - Mean Cell Hemoglobin 28.4 [27.0 - 34.0 pg] - Mean Cell Hemoglobin Conc 34.1 [32.0 - 36.0 g/dL] - Red Cell Distrib Width 13.8 [10.3 - 14.5 %] - Platelet Count - Automated 256 [150 - 400 K/uL] - Auto Neutrophil # Image has been removed. 7.55 [1.80 - 7.40 K/uL] - Auto Lymphocyte # Image has been removed. 0.91 [1.00 - 3.30 K/uL] - Auto Monocyte # 0.41 [0.00 - 0.90 K/uL] - Auto Eosinophil # 0.01 [0.00 - 0.50 K/uL] - Auto Basophil # 0.02 [0.00 - 0.20 K/uL] - Auto Neutrophil % Image has been removed. 84.6 [43.0 - 77.0 %] Differential percentages must be correlated with absolute numbers for  clinical significance. - Auto Lymphocyte % Image has been removed. 10.2 [13.0 - 44.0 %] - Auto Monocyte % 4.6 [2.0 - 14.0 %] - Auto Eosinophil % 0.1 [0.0 - 6.0 %] - Auto Basophil % 0.2 [0.0 - 2.0 %] - Auto Immature Granulocyte % 0.3 [0.0 - 0.9 %] (Includes meta, myelo and promyelocytes). Mild elevations in immature  granulocytes may be seen with many inflammatory processes and pregnancy;  clinical correlation suggested. - Nucleated RBC 0 [0 - 0 /100 WBCs] Infectious Disease:  08-Dec-2023 15:20, Flu With COVID-19 By SHANE - SARS-CoV-2 Result NotDetec [NotDetec] EUA/IVD  This Respiratory Panel uses polymerase chain reaction (PCR) to detect for  influenza A; influenza B; and SARS-CoV-2.  This test was validated by [facility] and is in use under the FDA  Emergency Use Authorization (EUA) for clinical labs CLIA-certified to  perform high complexity testing. Test results should be correlated with  clinical presentation, patient history, and epidemiology.   X-Ray, Fluoroscopy:  08-Dec-2023 18:09, CT Chest No Cont - PACS Image: Image(s) Available  Wet Read: There are no Wet Read(s) to document.  (1) Order Name: Xray Chest 1 View- PORTABLE-Urgent Order ID: [id number] Order Date/Time: 08-Dec-2023 15:22 Order Status: Resulted.  Interpretation Date/Time: 08-Dec-2023 19:09 Interpreted By: Artemio Partida  Interpretation: CXR negative - No infiltrates, No consolidation, No atelectasis seen.  - EKG Date/Time: 08-Dec-2023 16:32 - Rate: 113 - Interpretation: abnormal - Abnormal Rhythm: sinus tachycardia - Axis: Normal - OR: 134 - QRS: 94 - QTC: 447 - ST/T Wave: No STEMI  CONSULTATIONS/SHIFT CHANGE: Shift Change: - Pending Items to be Checked and Documented: labs, CT/MRI results - Sign-Out Time: 08-Dec-2023 16:21 - Receiving Physician: Dr. Partida - Shift Change Details: F/U Labs; CT [id number]  DISPOSITION: Care Plan - Instructions: Principal Discharge DX: Viral URI with cough.  Impression: 1.  Principal Discharge Dx Viral URI with cough.  Medical Decision Making: - The following orders were submitted: Imaging Studies, Medications - Clinical Summary (MDM): Summarize the clinical encounter 2 week of cough persistent not improving also with occasional wheezing no fever. Patient well appearing no acute distress, lungs clear no acute distress however persistent cough noted. Will check CXR r/o PNA. Treat for possible cough variant asthma with duonebs and solumedrol; Re-eval.   pt with CT chest noted no acute infiltrates otherwise given 2 weeks of cough -will dc with doxy  - Follow-up Instructions (will be supplied to the patient only if discharged)  1) Take tylenol and/or motrin for pain and/or fever 2) Take prescription medication as instructed 3) Follow-up with your pulmonologist 4) Follow up with your primary care doctor 5) Return to the ER for worsening or concerning symptoms   Upper Respiratory Infection, Adult An upper respiratory infection (URI) is a common viral infection of the nose, throat, and upper air passages that lead to the lungs. The most common type of URI is the common cold. URIs usually get better on their own, without medical treatment.  What are the causes? A URI is caused by a virus. You may catch a virus by: Breathing in droplets from an infected person's cough or sneeze. Touching something that has been exposed to the virus (is contaminated) and then touching your mouth, nose, or eyes. What increases the risk? You are more likely to get a URI if: You are very young or very old. You have close contact with others, such as at work, school, or a health care facility. You smoke. You have long-term (chronic) heart or lung disease. You have a weakened disease-fighting system (immune system). You have nasal allergies or asthma. You are experiencing a lot of stress. You have poor nutrition. What are the signs or symptoms? A URI usually involves some of the following symptoms: Runny or stuffy (congested) nose. Cough. Sneezing. Sore throat. Headache. Fatigue. Fever. Loss of appetite. Pain in your forehead, behind your eyes, and over your cheekbones (sinus pain). Muscle aches. Redness or irritation of the eyes. Pressure in the ears or face. How is this diagnosed? This condition may be diagnosed based on your medical history and symptoms, and a physical exam. Your health care provider may use a swab to take a mucus sample from your nose (nasal swab). This sample can be tested to determine what virus is causing the illness.  How is this treated? URIs usually get better on their own within 7?10 days. Medicines cannot cure URIs, but your health care provider may recommend certain medicines to help relieve symptoms, such as: Over-the-counter cold medicines. Cough suppressants. Coughing is a type of defense against infection that helps to clear the respiratory system, so take these medicines only as recommended by your health care provider. Fever-reducing medicines. Follow these instructions at home: Activity  Rest as needed. If you have a fever, stay home from work or school until your fever is gone or until your health care provider says your URI cannot spread to other people (is no longer contagious). Your health care provider may have you wear a face mask to prevent your infection from spreading. Relieving symptoms  Gargle with a mixture of salt and water 3?4 times a day or as needed. To make salt water, completely dissolve ?1 tsp (3?6 g) of salt in 1 cup (237 mL) of warm water. Use a cool-mist humidifier to add moisture to the air. This can help you breathe more easily. Eating and drinking  A comparison of three sample cups showing dark yellow, yellow, and pale yellow urine. Drink enough fluid to keep your urine pale yellow. Eat soups and other clear broths. General instructions  A sign showing that a person should not smoke. Take over-the-counter and prescription medicines only as told by your health care provider. These include cold medicines, fever reducers, and cough suppressants. Do not use any products that contain nicotine or tobacco. These products include cigarettes, chewing tobacco, and vaping devices, such as e-cigarettes. If you need help quitting, ask your health care provider. Stay away from secondhand smoke. Stay up to date on all immunizations, including the yearly (annual) flu vaccine. Keep all follow-up visits. This is important. How to prevent the spread of infection to others  Washing hands with soap and water. URIs can be contagious. To prevent the infection from spreading: Wash your hands with soap and water for at least 20 seconds. If soap and water are not available, use hand . Avoid touching your mouth, face, eyes, or nose. Cough or sneeze into a tissue or your sleeve or elbow instead of into your hand or into the air. Contact a health care provider if: You are getting worse instead of better. You have a fever or chills. Your mucus is brown or red. You have yellow or brown discharge coming from your nose. You have pain in your face, especially when you bend forward. You have swollen neck glands. You have pain while swallowing. You have white areas in the back of your throat. Get help right away if: You have shortness of breath that gets worse. You have severe or persistent: Headache. Ear pain. Sinus pain. Chest pain. You have chronic lung disease along with any of the following: Making high-pitched whistling sounds when you breathe, most often when you breathe out (wheezing). Prolonged cough (more than 14 days). Coughing up blood. A change in your usual mucus. You have a stiff neck. You have changes in your: Vision. Hearing. Thinking. Mood. These symptoms may be an emergency. Get help right away. Call 911. Do not wait to see if the symptoms will go away. Do not drive yourself to the hospital. Summary An upper respiratory infection (URI) is a common infection of the nose, throat, and upper air passages that lead to the lungs. A URI is caused by a virus. URIs usually get better on their own within 7?10 days. Medicines cannot cure URIs, but your health care provider may recommend certain medicines to help relieve symptoms. This information is not intended to replace advice given to you by your health care provider. Make sure you discuss any questions you have with your health care provider.  Disposition: Disposition: DISCHARGE.  You can return to work or school on: 12-Dec-2023.  Patient requests all Lab, Cardiology, and Radiology Results on their Discharge Instructions.  Discharge Disposition: Home.  Patient ready for discharge: Patient/Caregiver provided printed discharge information.  You can access the e-Tag Patient Portal offered by [facility] by registering at the following website: [email or web address]By joining [facility] portal, you will also be able to view your health information using other applications (apps) compatible with our system.  Prescriptions: * Outpatient Medication Status not yet specified  ATTESTATION STATEMENT: Attestations Statements: Attending Statement: Attending Only.  PROVIDER CARE INITIATION: - Care Initiated by: Birdie Balbuena(Attending) - Provider Care Initiated at: 08-Dec-2023 13:42   Electronic Signatures: Artemio Partida) (Signed 08-Dec-2023 19:09)  Authored: RESULTS, DISPOSITION Birdie Balbuena) (Signed 15-Dec-2023 12:46)  Authored: HISTORY OF PRESENT ILLNESS, PAST MEDICAL/SURGICAL/FAMILY/SOCIAL HISTORY, ALLERGIES AND HOME MEDICATIONS, REVIEW OF SYSTEMS, PHYSICAL EXAM, CURRENT ORDERS/, RESULTS, CONSULTATIONS/SHIFT CHANGE, DISPOSITION, ATTESTATION STATEMENT, STROKE, PROVIDER CARE INITIATION   Last Updated: 15-Dec-2023 12:46 by Birdie Balbuena)  References: 1. Data Referenced From "ED ADULT Triage Note" 08-Dec-2023 12:15  12/26: Patient now feels much better. Went to gym without issues.

## 2024-02-14 NOTE — PHYSICAL EXAM
[TextBox_2] : Physical exam limited ^ telehealth encounter no acute distress, well nourished and well-appearing. [No Acute Distress] : no acute distress [Normal Oropharynx] : normal oropharynx [Normal Appearance] : normal appearance [No Neck Mass] : no neck mass [Normal Rate/Rhythm] : normal rate/rhythm [Normal S1, S2] : normal s1, s2 [No Murmurs] : no murmurs [No Resp Distress] : no resp distress [Clear to Auscultation Bilaterally] : clear to auscultation bilaterally [No Abnormalities] : no abnormalities [Benign] : benign [Normal Gait] : normal gait [No Clubbing] : no clubbing [No Cyanosis] : no cyanosis [No Edema] : no edema [FROM] : FROM [Normal Color/ Pigmentation] : normal color/ pigmentation [No Focal Deficits] : no focal deficits [Oriented x3] : oriented x3 [Normal Affect] : normal affect

## 2024-03-04 ENCOUNTER — APPOINTMENT (OUTPATIENT)
Dept: PULMONOLOGY | Facility: CLINIC | Age: 29
End: 2024-03-04

## 2024-05-24 ENCOUNTER — APPOINTMENT (OUTPATIENT)
Dept: CARDIOLOGY | Facility: CLINIC | Age: 29
End: 2024-05-24
Payer: COMMERCIAL

## 2024-05-24 ENCOUNTER — NON-APPOINTMENT (OUTPATIENT)
Age: 29
End: 2024-05-24

## 2024-05-24 VITALS
SYSTOLIC BLOOD PRESSURE: 126 MMHG | DIASTOLIC BLOOD PRESSURE: 80 MMHG | BODY MASS INDEX: 35.07 KG/M2 | OXYGEN SATURATION: 98 % | WEIGHT: 245 LBS | HEIGHT: 70 IN | HEART RATE: 91 BPM

## 2024-05-24 DIAGNOSIS — I10 ESSENTIAL (PRIMARY) HYPERTENSION: ICD-10-CM

## 2024-05-24 PROCEDURE — 93000 ELECTROCARDIOGRAM COMPLETE: CPT

## 2024-05-24 PROCEDURE — 99204 OFFICE O/P NEW MOD 45 MIN: CPT | Mod: 25

## 2024-05-24 PROCEDURE — G2211 COMPLEX E/M VISIT ADD ON: CPT | Mod: NC

## 2024-05-24 RX ORDER — MONTELUKAST SODIUM 10 MG/1
10 TABLET, FILM COATED ORAL DAILY
Refills: 0 | Status: ACTIVE | COMMUNITY

## 2024-05-24 RX ORDER — ALBUTEROL SULFATE 90 UG/1
108 (90 BASE) INHALANT RESPIRATORY (INHALATION)
Refills: 0 | Status: ACTIVE | COMMUNITY

## 2024-05-24 RX ORDER — ALPRAZOLAM 0.25 MG/1
0.25 TABLET ORAL
Refills: 0 | Status: ACTIVE | COMMUNITY

## 2024-05-24 RX ORDER — ESCITALOPRAM OXALATE 20 MG/1
20 TABLET ORAL DAILY
Refills: 0 | Status: ACTIVE | COMMUNITY

## 2024-05-24 RX ORDER — DEXTROAMPHETAMINE SACCHARATE, AMPHETAMINE ASPARTATE, DEXTROAMPHETAMINE SULFATE, AND AMPHETAMINE SULFATE 1.25; 1.25; 1.25; 1.25 MG/1; MG/1; MG/1; MG/1
5 TABLET ORAL DAILY
Refills: 0 | Status: ACTIVE | COMMUNITY

## 2024-05-24 RX ORDER — MONTELUKAST 10 MG/1
10 TABLET, FILM COATED ORAL
Qty: 90 | Refills: 3 | Status: DISCONTINUED | COMMUNITY
Start: 2019-08-15 | End: 2024-05-24

## 2024-05-24 RX ORDER — AMLODIPINE BESYLATE 5 MG/1
5 TABLET ORAL DAILY
Qty: 90 | Refills: 3 | Status: ACTIVE | COMMUNITY
Start: 1900-01-01 | End: 1900-01-01

## 2024-05-24 RX ORDER — ALBUTEROL SULFATE 90 UG/1
108 (90 BASE) INHALANT RESPIRATORY (INHALATION)
Qty: 3 | Refills: 3 | Status: DISCONTINUED | COMMUNITY
Start: 2019-06-11 | End: 2024-05-24

## 2024-05-24 RX ORDER — ALBUTEROL SULFATE 90 UG/1
108 (90 BASE) INHALANT RESPIRATORY (INHALATION)
Qty: 3 | Refills: 3 | Status: DISCONTINUED | COMMUNITY
Start: 2019-08-15 | End: 2024-05-24

## 2024-05-24 NOTE — PHYSICAL EXAM
09/13/22 1000   C-SSRS (Frequent Screen)   2. Have you actually had any thoughts of killing yourself? No   Suicide Evaluation Negative Screen - White   Nursing Suicide Assessment Note - PHP    Current assessment:    Current C-SSRS score: (P) Negative Screen - White     Protective Factors / Reason for Living: Responsibility to children, Other    Interventions:    -   Maintain current plan of care   [Well Developed] : well developed [Well Nourished] : well nourished [No Acute Distress] : no acute distress [Normal Conjunctiva] : normal conjunctiva [Normal Venous Pressure] : normal venous pressure [No Carotid Bruit] : no carotid bruit [Normal S1, S2] : normal S1, S2 [No Murmur] : no murmur [No Rub] : no rub [No Gallop] : no gallop [Clear Lung Fields] : clear lung fields [Good Air Entry] : good air entry [No Respiratory Distress] : no respiratory distress  [Soft] : abdomen soft [Non Tender] : non-tender [No Masses/organomegaly] : no masses/organomegaly [Normal Bowel Sounds] : normal bowel sounds [Normal Gait] : normal gait [No Edema] : no edema [No Cyanosis] : no cyanosis [No Clubbing] : no clubbing [No Varicosities] : no varicosities [No Rash] : no rash [No Skin Lesions] : no skin lesions [Moves all extremities] : moves all extremities [No Focal Deficits] : no focal deficits [Normal Speech] : normal speech [Alert and Oriented] : alert and oriented [Normal memory] : normal memory

## 2024-06-03 NOTE — REASON FOR VISIT
[FreeTextEntry1] : Ambar 29yo man with no prior cardiac hx... here for eval of HTN. Dx recently and placed on amlodipine 5mg w/ good effect. BPs as high as 160/90 going back to 2019. Creat 1.3 Renal US neg manolo normal

## 2024-06-03 NOTE — DISCUSSION/SUMMARY
[EKG obtained to assist in diagnosis and management of assessed problem(s)] : EKG obtained to assist in diagnosis and management of assessed problem(s) [FreeTextEntry1] : Ambar 27yo man with no prior cardiac hx... here for eval of HTN. Dx recently and placed on amlodipine 5mg w/ good effect. BPs as high as 160/90 going back to 2019. Creat 1.3 Renal US neg manolo normal -2D echo to eval for LVH -cont amlodipine (?? switch to ACE/ARB w/ elevated creat... asked to d/w renal) -advised to f/u w/ renal as well -awaiting dental procedures... at exceeding low cardiovascular risk for low risk procedures; no cardiac contraindication to proceeding

## 2024-06-24 ENCOUNTER — APPOINTMENT (OUTPATIENT)
Dept: CARDIOLOGY | Facility: CLINIC | Age: 29
End: 2024-06-24
Payer: COMMERCIAL

## 2024-06-24 PROCEDURE — 93306 TTE W/DOPPLER COMPLETE: CPT

## 2024-09-17 ENCOUNTER — RX RENEWAL (OUTPATIENT)
Age: 29
End: 2024-09-17

## 2024-11-27 ENCOUNTER — RX RENEWAL (OUTPATIENT)
Age: 29
End: 2024-11-27

## 2025-03-17 ENCOUNTER — RX RENEWAL (OUTPATIENT)
Age: 30
End: 2025-03-17

## 2025-08-09 ENCOUNTER — NON-APPOINTMENT (OUTPATIENT)
Age: 30
End: 2025-08-09